# Patient Record
Sex: FEMALE | Race: WHITE | NOT HISPANIC OR LATINO | URBAN - METROPOLITAN AREA
[De-identification: names, ages, dates, MRNs, and addresses within clinical notes are randomized per-mention and may not be internally consistent; named-entity substitution may affect disease eponyms.]

---

## 2017-01-13 ENCOUNTER — AMBULATORY - HEALTHEAST (OUTPATIENT)
Dept: LAB | Facility: HOSPITAL | Age: 42
End: 2017-01-13

## 2017-01-13 DIAGNOSIS — Z79.899 ENCOUNTER FOR LONG-TERM (CURRENT) USE OF OTHER MEDICATIONS: ICD-10-CM

## 2017-02-15 ENCOUNTER — AMBULATORY - HEALTHEAST (OUTPATIENT)
Dept: LAB | Facility: HOSPITAL | Age: 42
End: 2017-02-15

## 2017-02-15 DIAGNOSIS — Z79.899 ENCOUNTER FOR LONG-TERM (CURRENT) USE OF OTHER MEDICATIONS: ICD-10-CM

## 2017-05-08 ENCOUNTER — AMBULATORY - HEALTHEAST (OUTPATIENT)
Dept: LAB | Facility: HOSPITAL | Age: 42
End: 2017-05-08

## 2017-05-08 DIAGNOSIS — F25.9 SCHIZOAFFECTIVE DISORDER (H): ICD-10-CM

## 2017-06-20 ENCOUNTER — AMBULATORY - HEALTHEAST (OUTPATIENT)
Dept: LAB | Facility: HOSPITAL | Age: 42
End: 2017-06-20

## 2017-06-20 DIAGNOSIS — F25.0 SCHIZOAFFECTIVE DISORDER, BIPOLAR TYPE (H): ICD-10-CM

## 2017-07-18 ENCOUNTER — AMBULATORY - HEALTHEAST (OUTPATIENT)
Dept: LAB | Facility: HOSPITAL | Age: 42
End: 2017-07-18

## 2017-07-18 DIAGNOSIS — Z79.899 ENCOUNTER FOR LONG-TERM (CURRENT) USE OF OTHER MEDICATIONS: ICD-10-CM

## 2017-07-18 DIAGNOSIS — F25.0 SCHIZOAFFECTIVE DISORDER, BIPOLAR TYPE (H): ICD-10-CM

## 2017-08-15 ENCOUNTER — AMBULATORY - HEALTHEAST (OUTPATIENT)
Dept: LAB | Facility: HOSPITAL | Age: 42
End: 2017-08-15

## 2017-08-15 DIAGNOSIS — F25.0 SCHIZOAFFECTIVE DISORDER, BIPOLAR TYPE (H): ICD-10-CM

## 2017-08-15 DIAGNOSIS — Z79.899 ENCOUNTER FOR LONG-TERM (CURRENT) USE OF OTHER MEDICATIONS: ICD-10-CM

## 2017-09-18 ENCOUNTER — AMBULATORY - HEALTHEAST (OUTPATIENT)
Dept: LAB | Facility: HOSPITAL | Age: 42
End: 2017-09-18

## 2017-09-18 DIAGNOSIS — F25.0 SCHIZOAFFECTIVE DISORDER, BIPOLAR TYPE (H): ICD-10-CM

## 2017-09-18 DIAGNOSIS — Z79.899 ENCOUNTER FOR LONG-TERM (CURRENT) USE OF OTHER MEDICATIONS: ICD-10-CM

## 2017-11-06 ENCOUNTER — AMBULATORY - HEALTHEAST (OUTPATIENT)
Dept: LAB | Facility: HOSPITAL | Age: 42
End: 2017-11-06

## 2017-11-06 DIAGNOSIS — Z79.899 NEED FOR PROPHYLACTIC CHEMOTHERAPY: ICD-10-CM

## 2017-12-11 ENCOUNTER — AMBULATORY - HEALTHEAST (OUTPATIENT)
Dept: LAB | Facility: HOSPITAL | Age: 42
End: 2017-12-11

## 2017-12-11 DIAGNOSIS — Z79.899 DRUG THERAPY: ICD-10-CM

## 2018-01-08 ENCOUNTER — AMBULATORY - HEALTHEAST (OUTPATIENT)
Dept: LAB | Facility: HOSPITAL | Age: 43
End: 2018-01-08

## 2018-01-08 DIAGNOSIS — F25.0 SCHIZOAFFECTIVE DISORDER, BIPOLAR TYPE (H): ICD-10-CM

## 2018-01-08 DIAGNOSIS — Z79.899 NEED FOR PROPHYLACTIC CHEMOTHERAPY: ICD-10-CM

## 2018-01-08 LAB
BASOPHILS # BLD AUTO: 0.1 THOU/UL (ref 0–0.2)
BASOPHILS NFR BLD AUTO: 1 % (ref 0–2)
EOSINOPHIL # BLD AUTO: 0.2 THOU/UL (ref 0–0.4)
EOSINOPHIL NFR BLD AUTO: 2 % (ref 0–6)
ERYTHROCYTE [DISTWIDTH] IN BLOOD BY AUTOMATED COUNT: 13.3 % (ref 11–14.5)
HCT VFR BLD AUTO: 42.3 % (ref 35–47)
HGB BLD-MCNC: 14.6 G/DL (ref 12–16)
LYMPHOCYTES # BLD AUTO: 1.9 THOU/UL (ref 0.8–4.4)
LYMPHOCYTES NFR BLD AUTO: 18 % (ref 20–40)
MCH RBC QN AUTO: 30.9 PG (ref 27–34)
MCHC RBC AUTO-ENTMCNC: 34.5 G/DL (ref 32–36)
MCV RBC AUTO: 90 FL (ref 80–100)
MONOCYTES # BLD AUTO: 0.4 THOU/UL (ref 0–0.9)
MONOCYTES NFR BLD AUTO: 4 % (ref 2–10)
NEUTROPHILS # BLD AUTO: 7.6 THOU/UL (ref 2–7.7)
NEUTROPHILS NFR BLD AUTO: 75 % (ref 50–70)
PLATELET # BLD AUTO: 208 THOU/UL (ref 140–440)
PMV BLD AUTO: 10.3 FL (ref 8.5–12.5)
RBC # BLD AUTO: 4.72 MILL/UL (ref 3.8–5.4)
WBC: 10.2 THOU/UL (ref 4–11)

## 2018-03-08 ENCOUNTER — AMBULATORY - HEALTHEAST (OUTPATIENT)
Dept: LAB | Facility: HOSPITAL | Age: 43
End: 2018-03-08

## 2018-03-08 DIAGNOSIS — Z79.899 ENCOUNTER FOR LONG-TERM (CURRENT) USE OF MEDICATIONS: ICD-10-CM

## 2018-03-08 DIAGNOSIS — F25.0 SCHIZOAFFECTIVE DISORDER, BIPOLAR TYPE (H): ICD-10-CM

## 2018-03-08 LAB
BASOPHILS # BLD AUTO: 0 THOU/UL (ref 0–0.2)
BASOPHILS NFR BLD AUTO: 1 % (ref 0–2)
EOSINOPHIL # BLD AUTO: 0.2 THOU/UL (ref 0–0.4)
EOSINOPHIL NFR BLD AUTO: 2 % (ref 0–6)
ERYTHROCYTE [DISTWIDTH] IN BLOOD BY AUTOMATED COUNT: 13.7 % (ref 11–14.5)
HCT VFR BLD AUTO: 39.8 % (ref 35–47)
HGB BLD-MCNC: 14 G/DL (ref 12–16)
LYMPHOCYTES # BLD AUTO: 1.7 THOU/UL (ref 0.8–4.4)
LYMPHOCYTES NFR BLD AUTO: 24 % (ref 20–40)
MCH RBC QN AUTO: 31.2 PG (ref 27–34)
MCHC RBC AUTO-ENTMCNC: 35.2 G/DL (ref 32–36)
MCV RBC AUTO: 89 FL (ref 80–100)
MONOCYTES # BLD AUTO: 0.5 THOU/UL (ref 0–0.9)
MONOCYTES NFR BLD AUTO: 7 % (ref 2–10)
NEUTROPHILS # BLD AUTO: 4.6 THOU/UL (ref 2–7.7)
NEUTROPHILS NFR BLD AUTO: 66 % (ref 50–70)
PLATELET # BLD AUTO: 188 THOU/UL (ref 140–440)
PMV BLD AUTO: 10.5 FL (ref 8.5–12.5)
RBC # BLD AUTO: 4.49 MILL/UL (ref 3.8–5.4)
WBC: 7.1 THOU/UL (ref 4–11)

## 2018-04-18 ENCOUNTER — OFFICE VISIT (OUTPATIENT)
Dept: OTHER | Facility: OUTPATIENT CENTER | Age: 43
End: 2018-04-18
Payer: MEDICARE

## 2018-04-18 ENCOUNTER — MEDICAL CORRESPONDENCE (OUTPATIENT)
Dept: HEALTH INFORMATION MANAGEMENT | Facility: CLINIC | Age: 43
End: 2018-04-18

## 2018-04-18 DIAGNOSIS — F25.0 SCHIZOAFFECTIVE DISORDER, BIPOLAR TYPE (H): Primary | ICD-10-CM

## 2018-04-18 DIAGNOSIS — F52.9 FEMALE SEXUAL DYSFUNCTION: ICD-10-CM

## 2018-04-18 ASSESSMENT — ANXIETY QUESTIONNAIRES
1. FEELING NERVOUS, ANXIOUS, OR ON EDGE: MORE THAN HALF THE DAYS
GAD7 TOTAL SCORE: 14
7. FEELING AFRAID AS IF SOMETHING AWFUL MIGHT HAPPEN: MORE THAN HALF THE DAYS
5. BEING SO RESTLESS THAT IT IS HARD TO SIT STILL: MORE THAN HALF THE DAYS
3. WORRYING TOO MUCH ABOUT DIFFERENT THINGS: MORE THAN HALF THE DAYS
6. BECOMING EASILY ANNOYED OR IRRITABLE: MORE THAN HALF THE DAYS
2. NOT BEING ABLE TO STOP OR CONTROL WORRYING: MORE THAN HALF THE DAYS

## 2018-04-18 ASSESSMENT — PATIENT HEALTH QUESTIONNAIRE - PHQ9: 5. POOR APPETITE OR OVEREATING: MORE THAN HALF THE DAYS
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
## 2018-04-18 NOTE — MR AVS SNAPSHOT
After Visit Summary   4/18/2018    Kristie Behrens    MRN: [id number]           Patient Information     Date Of Birth          1975        Visit Information        Provider Department      4/18/2018 5:30 PM Angela Walden LP [facility]        Today's Diagnoses     Schizoaffective disorder, bipolar type (H)    -  1    Female sexual dysfunction           Follow-ups after your visit        Your next 10 appointments already scheduled     May 02, 2018  4:00 PM CDT   Individual Therapy 53+ minutes with Angela Walden LP   [facility] ([facility])    [address]  [address]  [facility] [address]   [phone]            May 16, 2018  4:00 PM CDT   Individual Therapy 53+ minutes with Angela Walden LP   [facility] ([facility])    [address]  [address]  [facility] [address]   [phone]            May 30, 2018  4:00 PM CDT   Individual Therapy 53+ minutes with Angela Walden LP   [facility] ([facility])    [address]  [address]  [facility] [address]   [phone]            Jun 13, 2018  4:00 PM CDT   Individual Therapy 53+ minutes with Angela Walden LP   [facility] ([facility])    [address]  [address]  [facility] [address]   [phone]            Jun 27, 2018  4:00 PM CDT   Individual Therapy 53+ minutes with Angela Walden LP   [facility] ([facility])    [address]  [address]  [facility] [address]   [phone]              Who to contact     Please call your clinic at [phone] to:    Ask questions about your health    Make or cancel appointments    Discuss your medicines    Learn about your test results    Speak to your doctor            Additional Information About Your Visit        MyChart Information     MyChart is  an electronic gateway that provides easy, online access to your medical records. With BackTrack, you can request a clinic appointment, read your test results, renew a prescription or communicate with your care team.     To sign up for BackTrack visit the website at [email or web address]   You will be asked to enter the access code listed below, as well as some personal information. Please follow the directions to create your username and password.     Your access code is: [email or web address]  Expires: 2018 11:54 PM     Your access code will  in 90 days. If you need help or a new code, please contact your [facility] or call [phone] for assistance.        Care EveryWhere ID     This is your Care EveryWhere ID. This could be used by other organizations to access your [facility] medical records  [id number]         Blood Pressure from Last 3 Encounters:   No data found for BP    Weight from Last 3 Encounters:   No data found for Wt              We Performed the Following     Diagnostic Assessment (complete) [07052]     Psychotherapy Interactive Complexity [89146]        Primary Care Provider    None Specified       No primary provider on file.        Equal Access to Services     [facility] PeninsulaFRANKIE : Hadii philly Vicente, bill bravo, chrissy palacios, lynn de león . So [facility] [phone].    ATENCIÓN: Si habla español, tiene a andrews disposición servicios gratuitos de asistencia lingüística. Llame al [phone].    We comply with applicable federal civil rights laws and [place] laws. We do not discriminate on the basis of race, color, national origin, age, disability, sex, sexual orientation, or gender identity.            Thank you!     Thank you for choosing [facility]  for your care. Our goal is always to provide you with excellent care. Hearing back from our patients is one way we can continue to improve our services.  Please take a few minutes to complete the written survey that you may receive in the mail after your visit with us. Thank you!             Your Updated Medication List - Protect others around you: Learn how to safely use, store and throw away your medicines at www.disposemymeds.org.      Notice  As of 4/18/2018 11:59 PM    You have not been prescribed any medications.

## 2018-04-19 ENCOUNTER — TELEPHONE (OUTPATIENT)
Dept: OTHER | Facility: OUTPATIENT CENTER | Age: 43
End: 2018-04-19

## 2018-04-20 ASSESSMENT — ANXIETY QUESTIONNAIRES: GAD7 TOTAL SCORE: 14

## 2018-04-20 ASSESSMENT — PATIENT HEALTH QUESTIONNAIRE - PHQ9: SUM OF ALL RESPONSES TO PHQ QUESTIONS 1-9: 18

## 2018-04-22 PROBLEM — F52.9 FEMALE SEXUAL DYSFUNCTION: Status: ACTIVE | Noted: 2018-04-22

## 2018-04-22 PROBLEM — F25.0 SCHIZOAFFECTIVE DISORDER, BIPOLAR TYPE (H): Status: ACTIVE | Noted: 2018-04-22

## 2018-04-29 ENCOUNTER — TELEPHONE (OUTPATIENT)
Dept: OTHER | Facility: OUTPATIENT CENTER | Age: 43
End: 2018-04-29

## 2018-05-01 ENCOUNTER — AMBULATORY - HEALTHEAST (OUTPATIENT)
Dept: LAB | Facility: HOSPITAL | Age: 43
End: 2018-05-01

## 2018-05-01 DIAGNOSIS — Z79.899 ENCOUNTER FOR LONG-TERM (CURRENT) USE OF MEDICATIONS: ICD-10-CM

## 2018-05-01 LAB
BASOPHILS # BLD AUTO: 0.1 THOU/UL (ref 0–0.2)
BASOPHILS NFR BLD AUTO: 1 % (ref 0–2)
EOSINOPHIL # BLD AUTO: 0.1 THOU/UL (ref 0–0.4)
EOSINOPHIL NFR BLD AUTO: 2 % (ref 0–6)
ERYTHROCYTE [DISTWIDTH] IN BLOOD BY AUTOMATED COUNT: 13.2 % (ref 11–14.5)
HCT VFR BLD AUTO: 41.4 % (ref 35–47)
HGB BLD-MCNC: 14.5 G/DL (ref 12–16)
LYMPHOCYTES # BLD AUTO: 1.5 THOU/UL (ref 0.8–4.4)
LYMPHOCYTES NFR BLD AUTO: 17 % (ref 20–40)
MCH RBC QN AUTO: 31.3 PG (ref 27–34)
MCHC RBC AUTO-ENTMCNC: 35 G/DL (ref 32–36)
MCV RBC AUTO: 89 FL (ref 80–100)
MONOCYTES # BLD AUTO: 0.7 THOU/UL (ref 0–0.9)
MONOCYTES NFR BLD AUTO: 8 % (ref 2–10)
NEUTROPHILS # BLD AUTO: 6.1 THOU/UL (ref 2–7.7)
NEUTROPHILS NFR BLD AUTO: 73 % (ref 50–70)
PLATELET # BLD AUTO: 222 THOU/UL (ref 140–440)
PMV BLD AUTO: 10.4 FL (ref 8.5–12.5)
RBC # BLD AUTO: 4.64 MILL/UL (ref 3.8–5.4)
WBC: 8.5 THOU/UL (ref 4–11)

## 2018-05-02 ENCOUNTER — TELEPHONE (OUTPATIENT)
Dept: OTHER | Facility: OUTPATIENT CENTER | Age: 43
End: 2018-05-02

## 2018-05-02 ENCOUNTER — OFFICE VISIT (OUTPATIENT)
Dept: OTHER | Facility: OUTPATIENT CENTER | Age: 43
End: 2018-05-02
Payer: MEDICARE

## 2018-05-02 DIAGNOSIS — F52.9 FEMALE SEXUAL DYSFUNCTION: Primary | ICD-10-CM

## 2018-05-02 DIAGNOSIS — F25.0 SCHIZOAFFECTIVE DISORDER, BIPOLAR TYPE (H): ICD-10-CM

## 2018-05-02 NOTE — PATIENT INSTRUCTIONS
Assignment:  1. Get copy of treatment plan from .  2. Ask  to come to session.  3. Tell therapist about therapy at [facility]

## 2018-05-02 NOTE — MR AVS SNAPSHOT
After Visit Summary   5/2/2018    Kristie Behrens    MRN: [id number]           Patient Information     Date Of Birth          1975        Visit Information        Provider Department      5/2/2018 4:00 PM Angela Walden LP [facility]        Today's Diagnoses     Female sexual dysfunction    -  1    Schizoaffective disorder, bipolar type (H)          Care Instructions    Assignment:  1. Get copy of treatment plan from .  2. Ask  to come to session.  3. Tell therapist about therapy at [facility]          Follow-ups after your visit        Your next 10 appointments already scheduled     May 16, 2018  4:00 PM CDT   Individual Therapy 53+ minutes with Angela Walden LP   [facility] ([facility])    [address]  [address]  [facility] [address]   [phone]            May 30, 2018  4:00 PM CDT   Individual Therapy 53+ minutes with Angela Walden LP   [facility] ([facility])    [address]  [address]  [facility] [address]   [phone]            Jun 13, 2018  4:00 PM CDT   Individual Therapy 53+ minutes with Angela Walden LP   [facility] ([facility])    [address]  [address]  [facility] [address]   [phone]            Jun 27, 2018  4:00 PM CDT   Individual Therapy 53+ minutes with Angela Walden LP   [facility] ([facility])    [address]  [address]  [facility] [address]   [phone]            Jul 11, 2018  4:00 PM CDT   Individual Therapy 53+ minutes with Angela Walden LP   [facility] ([facility])    [address]  [address]  [facility] [address]   [phone]            Jul 25, 2018  4:00 PM CDT   Individual Therapy 53+ minutes with Angela Walden LP   [facility] ([facility])    [address] [facility] [address]  [address]  [facility] [address]   [phone]              Who to contact     Please call your clinic at [phone] to:    Ask questions about your health    Make or cancel appointments    Discuss your medicines    Learn about your test results    Speak to your doctor            Additional Information About Your Visit        MyChart Information     [employer] is an electronic gateway that provides easy, online access to your medical records. With Parallocity, you can request a clinic appointment, read your test results, renew a prescription or communicate with your care team.     To sign up for Parallocity visit the website at [email or web address]   You will be asked to enter the access code listed below, as well as some personal information. Please follow the directions to create your username and password.     Your access code is: [email or web address]  Expires: 2018 11:54 PM     Your access code will  in 90 days. If you need help or a new code, please contact your [facility] or call [phone] for assistance.        Care EveryWhere ID     This is your Care EveryWhere ID. This could be used by other organizations to access your [facility] medical records  [id number]         Blood Pressure from Last 3 Encounters:   No data found for BP    Weight from Last 3 Encounters:   No data found for Wt              We Performed the Following     Individual Psychotherapy (53+ min) [43475]     Mental Health Tx Plan Scan (HIM Scan)     Psychotherapy Interactive Complexity [74886]        Primary Care Provider    None Specified       No primary provider on file.        Equal Access to Services     NI LEAL : Hadii philly Vicente, bill bravo, lynn goss. So [facility] [phone].    ATENCIÓN: Si habla español, tiene a andrews disposición servicios gratuitos de asistencia lingüística. Llame al  [phone].    We comply with applicable federal civil rights laws and [place] laws. We do not discriminate on the basis of race, color, national origin, age, disability, sex, sexual orientation, or gender identity.            Thank you!     Thank you for choosing [facility]  for your care. Our goal is always to provide you with excellent care. Hearing back from our patients is one way we can continue to improve our services. Please take a few minutes to complete the written survey that you may receive in the mail after your visit with us. Thank you!             Your Updated Medication List - Protect others around you: Learn how to safely use, store and throw away your medicines at www.disposemymeds.org.      Notice  As of 5/2/2018 11:59 PM    You have not been prescribed any medications.

## 2018-05-02 NOTE — PROGRESS NOTES
"[facility]  Program in Human Sexuality  Department of Family Medicine & Community Health  [facility]   [address]               [place] [address]  Phone: [phone]  Fax: [phone]  [email or web address]      [facility] -  Case Progress Note    Date of Service: 5/02/18  Client Name: Kristie Behrens  YOB: 1975  MRN:  [id number]  Treating Provider: Angela \"René\" SAURABH Walden, Ph.D  Type of Session: Individual  Present in Session: Patient only  Number of Minutes:  60  Health Maintenance Summary - Mental Health Treatment Plan       Status Date      Mental Health Tx Plan Q2 MOS Next Due 7/2/2018      Done 5/2/2018         Current Symptoms/Status:  Patient is seriously and persistently mental ill and is living on SSI most of her adulthood. Patient identifies as lesbian but has never had a lesbian sexual relationship. She is interested in finding a relationship. It has been 23 yrs since she's been in a \"love relationship\" and she would like to be in one. Noted she has \"some FGM (, Tibetan)\" issues about sexuality that involved an involuntary vaginal heroin overdose many years ago. Numerous Psychiatric hospitalizations: 2011, [facility]; 2007, [facility]; 2004, [facility]. Current Psychiatrist is Dr. Aramis Grover MD who prescribed Zyprexa, Latuda, 40 mg, Ambien. [facility] to present. , [facility], [facility], for 5 yrs. Current therapist - Es Hodge, every other week since 2017.     Progress Toward Treatment Goals:   This is 1st session since Diagnostic Assessment. Sleeping better in past 2 wks, getting out and doing things (having coffee at coffee shop, buy incense, grocery shopping weekly). Struggles in winter time. Stopped going to AA meetings end of March 2018 b/c it didn't feel good.     Intervention: Modality and Description:  Individual cognitive-behavioral, " "solution-focused, strength-based psychotherapy. Developed Treatment Plan which was signed and dated by patient and therapist. Encouraged patient to continue in AA. Patient expressed some dissatisfaction with current providers who see her as more mentally ill than she believes she is.    Response to Intervention:  Responsive. Struggled to write treatment plan on the board in a coherent, readable fashion but was cooperative in attempting to complete task. Seems to be in denial as to the seriousness of her mental health dx and issues.     Assignment:  1. Get copy of treatment plan from .  2. Ask  to come to session.    Interactive Complexity:  Communication difficulties present during current the psychiatric procedure included the need to manage maladaptive communication related to high anxiety, high reactivity, repeated questions, that complicated delivery of care.    Diagnosis:  Diagnoses       Codes Comments    Female sexual dysfunction    -  Primary F52.9     Schizoaffective disorder, bipolar type (H)     F25.0         Plan / Need for Future Services:  Return for individual therapy to treat dx problems in 2 weeks.        Angela \"René\" SAURABH Walden, Ph.D.,   Clinical/Medical Director, [place] Licensed Psychologist  [place] Licensed Marriage & Family Therapist & State Approved Supervisor    "

## 2018-05-03 LAB
CLOZAPINE SERPL-MCNC: 1032 NG/ML
CLOZAPINE+NOR SERPL-MCNC: 1753 NG/ML
CNO SERPL-MCNC: 122 NG/ML
NORCLOZAPINE SERPL-MCNC: 599 NG/ML

## 2018-05-04 NOTE — TELEPHONE ENCOUNTER
"First saw her in 2011 and last saw her April 10, 2015. Had extensive mental health team at  with schizoaffective disorder, bipolar type. Delusional in nature. Mentally unstable, very inconsistent attendance. Disregulated and lots of psychiatric crises. Still calls [facility] 1-2x per yr talking about the millions of dollars she has received in a settlement offering to send Dr. Byrnes money for inappropriate things. Wanted to work on issues of intimacy, sexuality and trauma but many of what she was talking about was delusional in nature. Has recommended to patient that they are not an appropriate site given the extent of her psychiatric issues and she needs to deal with her mental health issues with her psychiatric team.       Angela \"René\" SAURABH Walden, Ph.D.,   Clinical/Medical Director, [place] Licensed Psychologist  [place] Licensed Marriage & Family Therapist & State Approved Supervisor    "

## 2018-05-16 ENCOUNTER — OFFICE VISIT (OUTPATIENT)
Dept: OTHER | Facility: OUTPATIENT CENTER | Age: 43
End: 2018-05-16
Payer: MEDICARE

## 2018-05-16 DIAGNOSIS — F25.0 SCHIZOAFFECTIVE DISORDER, BIPOLAR TYPE (H): Primary | ICD-10-CM

## 2018-05-16 DIAGNOSIS — F52.9 FEMALE SEXUAL DYSFUNCTION: ICD-10-CM

## 2018-05-16 NOTE — MR AVS SNAPSHOT
After Visit Summary   5/16/2018    Kristie Behrens    MRN: [id number]           Patient Information     Date Of Birth          1975        Visit Information        Provider Department      5/16/2018 4:00 PM Angela Walden LP [facility]        Today's Diagnoses     Schizoaffective disorder, bipolar type (H)    -  1    Female sexual dysfunction           Follow-ups after your visit        Your next 10 appointments already scheduled     May 30, 2018  4:00 PM CDT   Individual Therapy 53+ minutes with Angela Walden LP   [facility] ([facility])    [address]  [address] 75[address]  [facility] [address]   [phone]            Jun 13, 2018  4:00 PM CDT   Individual Therapy 53+ minutes with Angela Walden LP   [facility] ([facility])    [address]  [address]  [facility] [address]   [phone]            Jun 27, 2018  4:00 PM CDT   Individual Therapy 53+ minutes with Angela Walden LP   [facility] ([facility])    [address]  [address]  [facility] [address]   [phone]            Jul 11, 2018  4:00 PM CDT   Individual Therapy 53+ minutes with Angela Walden LP   [facility] ([facility])    [address]  [address]  [facility] [address]   [phone]            Jul 25, 2018  4:00 PM CDT   Individual Therapy 53+ minutes with Angela Walden LP   [facility] ([facility])    [address]  [address]  [facility] [address]   [phone]              Who to contact     Please call your clinic at [phone] to:    Ask questions about your health    Make or cancel appointments    Discuss your medicines    Learn about your test results    Speak to your doctor            Additional Information About Your Visit        MyChart Information     MyChart is  an electronic gateway that provides easy, online access to your medical records. With TidyClub, you can request a clinic appointment, read your test results, renew a prescription or communicate with your care team.     To sign up for TidyClub visit the website at [email or web address]   You will be asked to enter the access code listed below, as well as some personal information. Please follow the directions to create your username and password.     Your access code is: [email or web address]  Expires: 2018 11:54 PM     Your access code will  in 90 days. If you need help or a new code, please contact your [facility] or call [phone] for assistance.        Care EveryWhere ID     This is your Care EveryWhere ID. This could be used by other organizations to access your [place] medical records  [id number]         Blood Pressure from Last 3 Encounters:   No data found for BP    Weight from Last 3 Encounters:   No data found for Wt              We Performed the Following     Individual Psychotherapy (53+ min) [15563]     Psychotherapy Interactive Complexity [75615]        Primary Care Provider    None Specified       No primary provider on file.        Equal Access to Services     MERCEDEZ [facility] : Hadii philly Vicente, bill bravo, chrissy palacios, lynn de león . So [facility] [phone].    ATENCIÓN: Si habla español, tiene a andrews disposición servicios gratuitos de asistencia lingüística. Llame al [phone].    We comply with applicable federal civil rights laws and [place] laws. We do not discriminate on the basis of race, color, national origin, age, disability, sex, sexual orientation, or gender identity.            Thank you!     Thank you for choosing [facility]  for your care. Our goal is always to provide you with excellent care. Hearing back from our patients is one way we can continue to improve our services.  Please take a few minutes to complete the written survey that you may receive in the mail after your visit with us. Thank you!             Your Updated Medication List - Protect others around you: Learn how to safely use, store and throw away your medicines at www.disposemymeds.org.      Notice  As of 5/16/2018  8:48 PM    You have not been prescribed any medications.

## 2018-05-16 NOTE — PROGRESS NOTES
"[facility]  Program in Human Sexuality  Department of Family Medicine & Community Health  [facility]   [address]               [place] [address]  Phone: [phone]  Fax: [phone]  [email or web address]      [facility] -  Case Progress Note    Date of Service: 5/16/18  Client Name: Kristie Behrens  YOB: 1975  MRN:  [id number]  Treating Provider: Angela \"René\" SAURABH Walden, Ph.D  Type of Session: Individual  Present in Session: Patient only  Number of Minutes:  70  Health Maintenance Summary - Mental Health Treatment Plan       Status Date      Mental Health Tx Plan Q2 MOS Next Due 7/2/2018      Done 5/2/2018         Current Symptoms/Status:  Patient is seriously and persistently mental ill and has lived on SSI most of her adulthood. Patient identifies as lesbian but has never had a lesbian sexual relationship. She is interested in finding a relationship. It has been 23 yrs since she's been in a \"love relationship\" and she would like to be in one. Noted she has \"some FGM (, Tibetan)\" issues about sexuality that involved an involuntary vaginal heroin overdose many years ago. Numerous Psychiatric hospitalizations: 2011, [facility]; 2007, [facility]; 2004, [facility]. Current Psychiatrist is Dr. Aramis Grover MD who prescribed Zyprexa, Latuda, 40 mg, Ambien. [facility] by [facility] to present. , [facility], Thiago Juarez, for 5 yrs. Current therapist - Es Hodeg, every other week since 2017.     Progress Toward Treatment Goals:   Has been a good week with lots of activities. Continues meeting with  (Mackenzie) every Tuesday, Fridays, and Sundays; added extra day b/c she sat in house. Activities: library 2x (3 books), 2x to [employer] (swimming, whirlpool/sauna, weight machines), supper 2x, bought roses), grocery store 2-4x. Plans to go AA mtg. AA meeting at [facility]. " Sleeping better in past 2 wks, getting out and doing things (having coffee at coffee shop, buy incense, grocery shopping weekly). Attended meditation and sermon at [facility].    Intervention: Modality and Description:  Individual cognitive-behavioral, solution-focused, strength-based psychotherapy. Encouraged patient to continue in AA. Patient called , Mackenzie and invited her to next session; Mackenzie indicated she would attend. Session focussed on completing Self-Identified Lesbian Internalized Homophobia Scale (Anuja and Roni, 2009) and Modern Homonegativity Scale (Sriram & Sriram, 2002); patient demonstrated low homophobia and low internalized homophobia on those scales. Patient expressed some dissatisfaction with current providers who see her as more mentally ill than she believes she is and she worries it may be related to old attitudes about mental illness and homosexuality. At meeting with , Mackenzie, we will discuss how they can show more acceptance/affirmation of her identity as a lesbian and ask if there are any lingering feelings left over from the days when being han was a mental illness. Discussed how her serious mental illness can impede ability to form intimate relationships.     Response to Intervention:  Responsive, agreeable and cooperative but seems to be in denial as to the seriousness of her mental health dx and issues. Seems to be stable and functional for past several weeks. Affect is flat and cognitive functioning is concrete and simplistic.    Assignment:  1. Come prepared to discuss concerns about mental illness and homosexuality with  in next session. Get copy of treatment plan from .    Interactive Complexity:  Communication difficulties present during current the psychiatric procedure included the need to manage maladaptive communication related to serious mental illness and cognitive flattening that complicated delivery of  "care.    Diagnosis:  Diagnoses       Codes Comments    Female sexual dysfunction    -  Primary F52.9     Schizoaffective disorder, bipolar type (H)     F25.0         Plan / Need for Future Services:  Return for individual therapy to treat dx problems in 2 weeks.        Angela \"René\" SAURABH Walden, Ph.D.,   Clinical/Medical Director, [place] Licensed Psychologist  [place] Licensed Marriage & Family Therapist & State Approved SupervisorFirst saw her in 2011 and last saw her April 10, 2015. Had extensive mental health team at               "

## 2018-05-26 ENCOUNTER — TELEPHONE (OUTPATIENT)
Dept: OTHER | Facility: OUTPATIENT CENTER | Age: 43
End: 2018-05-26
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
## 2018-05-30 ENCOUNTER — OFFICE VISIT (OUTPATIENT)
Dept: OTHER | Facility: OUTPATIENT CENTER | Age: 43
End: 2018-05-30
Payer: MEDICARE

## 2018-05-30 ENCOUNTER — TRANSFERRED RECORDS (OUTPATIENT)
Dept: HEALTH INFORMATION MANAGEMENT | Facility: CLINIC | Age: 43
End: 2018-05-30

## 2018-05-30 DIAGNOSIS — F25.0 SCHIZOAFFECTIVE DISORDER, BIPOLAR TYPE (H): Primary | ICD-10-CM

## 2018-05-30 DIAGNOSIS — F52.9 FEMALE SEXUAL DYSFUNCTION: ICD-10-CM

## 2018-05-30 NOTE — PROGRESS NOTES
"[facility]  Program in Human Sexuality  Department of Family Medicine & Community Health  [facility]   [address]               [place] [address]  Phone: [phone]  Fax: [phone]  [email or web address]      [facility] -  Case Progress Note    Date of Service: 5/30/18  Client Name: Kristie Behrens  YOB: 1975  MRN:  [id number]  Treating Provider: Angela \"René\" SAURABH Walden, Ph.D  Type of Session: Individual  Present in Session: Patient, Mackenzie Taylor RN, , [facility] attended for 30 min  Number of Minutes:  60  Health Maintenance Summary - Mental Health Treatment Plan       Status Date      Mental Health Tx Plan Q2 MOS Next Due 7/2/2018      Done 5/2/2018         Current Symptoms/Status:  Serious and persistent mental illness, has lived on SSI most of her adulthood. Patient identifies as lesbian but has never had a lesbian sexual relationship. She is interested in finding a relationship. It has been 23 yrs since she's been in a \"love relationship\" and she would like to be in one. Noted she has \"some FGM (, Tibetan)\" issues about sexuality that involved an involuntary vaginal heroin overdose many years ago. Numerous Psychiatric hospitalizations: 2011, [facility]; 2007, [facility]; 2004, [facility]. Current Psychiatrist is Dr. Aramis Grover MD who prescribed Zyprexa, Latuda, 40 mg, Ambien. [facility] by [facility] to present. , [facility], [facility] Juarez, for 5 yrs. Current therapist - Es Hodge, every other week since 2017.     Progress Toward Treatment Goals:   Brought  (Mackenzie Taylor) to session today as requested. Continues meeting with  (Mackenzie Taylor) and other case workers every Tuesday, Fridays, and Sundays; added extra day b/c she sat in house. Activities: library, 2x to [employer], [employer], grocery store. Plans to go " "AA mtgs and meditation and sermon at [facility].    Intervention: Modality and Description:  Individual cognitive-behavioral, solution-focused, strength-based psychotherapy. Reviewed [facility] Goals and [facility] Treatment Plan; exchanged copies with  (See scanned documents). Discussed how to tailor goals with an LGBTQ+ focus: LGBTQ+AA groups, LGBTQ+ coffee houses, LGBTQ+ AA sponsor. Reviewed major goals at [facility]:  1. Coffee with AA member  2. Call for own refills  3. Meet at a coffee shop. [employer] shops.  4. Use distractions from negative voices (write, color, meditation, etc.). Psychiatrist prescribed PRN Zyprexa for voices when they get bad. Voices and isolation are vicious cycle.  5. Blood drawn 1x a mo which pt is responsible for. Takes bus to [facility] in [place].   6. Fight her negative perceptions of others opinions of her; \"everyone hates me, no one loves me, no one cares about me, self-hatred\".     Mackenzie Taylor has been with [facility] team for 3-4 mos but patient worked with [facility] for 10 yrs. Patient has new goal: looking for a new place to live by Jan 2019.   Reviewed CD hx and agreed to attend LGBTQ+ AA group she used to attend and will investigate finding a mixed LGBTQ+ AA group closer to home to attend (see assignments).  Abstinent from drugs for 14 yrs.  Abstinent from alcohol since Feb 14, 2017  Relapsed Feb [id number]  Abstinent Feb 2012 - 2016    Response to Intervention:  Responsive, agreeable and cooperative; less in denial as to the seriousness of her mental health dx and issues. Seems to be stable and functional for past several weeks. Affect is flat and cognitive functioning is concrete, flat and simplistic.    Assignment:  1. Attend [facility] AA group at [facility] in [facility] ([place]) for LGBT+ at least once before next session.  2. Investigate LGBT+ AA groups and try to find one close to home.   3. Think about attending [facility] AA " "meeting and asking former sponsor Milvia out to coffee and asking her to be a temporary sponsor.    Interactive Complexity:  Communication difficulties present during current the psychiatric procedure included the need to manage maladaptive communication related to serious mental illness and cognitive flattening that complicated delivery of care.    Diagnosis:  Diagnoses       Codes Comments    Female sexual dysfunction    -  Primary F52.9     Schizoaffective disorder, bipolar type (H)     F25.0         Plan / Need for Future Services:  Return for individual therapy to treat dx problems in 2 weeks.        Angela \"René\" SAURABH Walden, Ph.D.,   Clinical/Medical Director, [place] Psychologist  [place] Licensed Marriage & Family Therapist & State Approved SupervisorFirst saw her in 2011 and last saw her April 10, 2015. Had extensive mental health team at               "

## 2018-05-30 NOTE — MR AVS SNAPSHOT
After Visit Summary   5/30/2018    Kristie Behrens    MRN: [id number]           Patient Information     Date Of Birth          1975        Visit Information        Provider Department      5/30/2018 4:00 PM Angela Walden LP [facility]        Today's Diagnoses     Schizoaffective disorder, bipolar type (H)    -  1    Female sexual dysfunction          Care Instructions    Assignment:  1. Attend [facility] AA group at [facility] in [facility] for LGBT+ at least once before next session.  2. Investigate LGBT+ AA groups and try to find one close to home.   3. Think about attending [facility] AA meeting and asking former sponsor Milvia out to coffee and asking her to be a temporary sponsor.          Follow-ups after your visit        Your next 10 appointments already scheduled     Jun 13, 2018  4:00 PM CDT   Individual Therapy 53+ minutes with Angela Walden LP   [facility] ([facility])    [address]  Mail [address] 7521  [facility] [address]   [phone]            Jun 27, 2018  4:00 PM CDT   Individual Therapy 53+ minutes with Angela Walden LP   [facility] ([facility])    [address]  Mail [address] 7521  [facility] [address]   [phone]            Jul 11, 2018  4:00 PM CDT   Individual Therapy 53+ minutes with Angela Walden LP   [facility] ([facility])    [address]  [address] 75[address]  [facility] [address]   [phone]            Jul 25, 2018  4:00 PM CDT   Individual Therapy 53+ minutes with Angela Walden LP   [facility] ([facility])    [address]  [address]  [facility] [address]   [phone]              Who to contact     Please call your clinic at [phone] to:    Ask questions about your health    Make or cancel appointments    Discuss your  medicines    Learn about your test results    Speak to your doctor            Additional Information About Your Visit        MyChart Information     iKaazhart is an electronic gateway that provides easy, online access to your medical records. With iKaazhart, you can request a clinic appointment, read your test results, renew a prescription or communicate with your care team.     To sign up for Grovot visit the website at [email or web address]   You will be asked to enter the access code listed below, as well as some personal information. Please follow the directions to create your username and password.     Your access code is: [email or web address]  Expires: 2018 11:54 PM     Your access code will  in 90 days. If you need help or a new code, please contact your [facility] or call [phone] for assistance.        Care EveryWhere ID     This is your Care EveryWhere ID. This could be used by other organizations to access your [place] medical records  [id number]         Blood Pressure from Last 3 Encounters:   No data found for BP    Weight from Last 3 Encounters:   No data found for Wt              We Performed the Following     Individual Psychotherapy (53+ min) [85962]     Psychotherapy Interactive Complexity [28495]        Primary Care Provider    None Specified       No primary provider on file.        Equal Access to Services     MERCEDEZ LEAL : Milan Vicente, bill bravo, qajordanata kaalmada suzanne, lynn lopez. So [facility] [phone].    ATENCIÓN: Si habla español, tiene a andrews disposición servicios gratuitos de asistencia lingüística. Llame al [phone].    We comply with applicable federal civil rights laws and [place] laws. We do not discriminate on the basis of race, color, national origin, age, disability, sex, sexual orientation, or gender identity.            Thank you!     Thank you for choosing [facility]  for your care. Our goal is always to provide you with excellent care. Hearing back from our patients is one way we can continue to improve our services. Please take a few minutes to complete the written survey that you may receive in the mail after your visit with us. Thank you!             Your Updated Medication List - Protect others around you: Learn how to safely use, store and throw away your medicines at www.disposemymeds.org.      Notice  As of 5/30/2018  5:35 PM    You have not been prescribed any medications.

## 2018-05-30 NOTE — PATIENT INSTRUCTIONS
Assignment:  1. Attend [facility] Women's AA group at [facility] in [facility] ([place] for LGBT+ at least once before next session.  2. Investigate LGBT+ AA groups and try to find one close to home.   3. Think about attending [facility] AA meeting and asking former sponsor Milvia out to coffee and asking her to be a temporary sponsor.

## 2018-05-31 ENCOUNTER — AMBULATORY - HEALTHEAST (OUTPATIENT)
Dept: LAB | Facility: HOSPITAL | Age: 43
End: 2018-05-31

## 2018-05-31 DIAGNOSIS — F25.0 SCHIZOAFFECTIVE DISORDER, BIPOLAR TYPE (H): ICD-10-CM

## 2018-05-31 DIAGNOSIS — Z79.899 ENCOUNTER FOR LONG-TERM (CURRENT) USE OF HIGH-RISK MEDICATION: ICD-10-CM

## 2018-05-31 LAB
ERYTHROCYTE [DISTWIDTH] IN BLOOD BY AUTOMATED COUNT: 12.7 % (ref 11–14.5)
HCT VFR BLD AUTO: 42.9 % (ref 35–47)
HGB BLD-MCNC: 14.9 G/DL (ref 12–16)
MCH RBC QN AUTO: 30.9 PG (ref 27–34)
MCHC RBC AUTO-ENTMCNC: 34.7 G/DL (ref 32–36)
MCV RBC AUTO: 89 FL (ref 80–100)
PLATELET # BLD AUTO: 223 THOU/UL (ref 140–440)
PMV BLD AUTO: 10.4 FL (ref 8.5–12.5)
RBC # BLD AUTO: 4.82 MILL/UL (ref 3.8–5.4)
WBC: 8.5 THOU/UL (ref 4–11)

## 2018-06-05 ENCOUNTER — TELEPHONE (OUTPATIENT)
Dept: OTHER | Facility: OUTPATIENT CENTER | Age: 43
End: 2018-06-05

## 2018-06-13 ENCOUNTER — OFFICE VISIT (OUTPATIENT)
Dept: OTHER | Facility: OUTPATIENT CENTER | Age: 43
End: 2018-06-13
Payer: MEDICAID

## 2018-06-13 DIAGNOSIS — F25.0 SCHIZOAFFECTIVE DISORDER, BIPOLAR TYPE (H): Primary | ICD-10-CM

## 2018-06-13 DIAGNOSIS — F52.9 FEMALE SEXUAL DYSFUNCTION: ICD-10-CM

## 2018-06-13 NOTE — MR AVS SNAPSHOT
After Visit Summary   6/13/2018    Kristie Behrens    MRN: [id number]           Patient Information     Date Of Birth          1975        Visit Information        Provider Department      6/13/2018 4:00 PM Angela Walden LP [facility]        Today's Diagnoses     Schizoaffective disorder, bipolar type (H)    -  1    Female sexual dysfunction          Care Instructions    Assignment:  1. Investigate LGBT+ AA coffee shops: [employer] and Day-by-Day Cafe.   2. Attend [facility] AA meeting in [place] WEEKLY on Wednesdays, 6:30 - 7:30 pm and take time.           Follow-ups after your visit        Your next 10 appointments already scheduled     Jun 26, 2018  3:00 PM CDT   Individual Therapy 53+ minutes with Angela Walden LP   [facility] ([facility])    [address]  Mail [address] 7521  [facility] [address]   [phone]            Jul 10, 2018 11:00 AM CDT   Individual Therapy 53+ minutes with Angela Walden LP   [facility] ([facility])    [address]  [address]  [facility] [address]   [phone]            Jul 24, 2018 11:00 AM CDT   Individual Therapy 53+ minutes with Angela Walden LP   [facility] ([facility])    [address]  Mail [address] 7521  [facility] [address]   [phone]            Aug 07, 2018  3:00 PM CDT   Individual Therapy 53+ minutes with Angela Walden LP   [facility] ([facility])    [address]  Mail [address] 7521  [facility] [address]   [phone]            Aug 21, 2018  5:00 PM CDT   Individual Therapy 53+ minutes with Angela Walden LP   [facility] ([facility])    [address]  Mail [address]  [facility] [address]   [phone]              Who to contact     Please call your clinic at [phone] to:    Ask questions  about your health    Make or cancel appointments    Discuss your medicines    Learn about your test results    Speak to your doctor            Additional Information About Your Visit        MyChart Information     [employer] is an electronic gateway that provides easy, online access to your medical records. With [employer], you can request a clinic appointment, read your test results, renew a prescription or communicate with your care team.     To sign up for [employer] visit the website at [email or web address]   You will be asked to enter the access code listed below, as well as some personal information. Please follow the directions to create your username and password.     Your access code is: [email or web address]  Expires: 2018 11:54 PM     Your access code will  in 90 days. If you need help or a new code, please contact your [facility] or call [phone] for assistance.        Care EveryWhere ID     This is your Care EveryWhere ID. This could be used by other organizations to access your [place] medical records  [id number]         Blood Pressure from Last 3 Encounters:   No data found for BP    Weight from Last 3 Encounters:   No data found for Wt              We Performed the Following     Individual Psychotherapy (53+ min) [08491]     Psychotherapy Interactive Complexity [21028]        Primary Care Provider    None Specified       No primary provider on file.        Equal Access to Services     MERCEDEZ LEAL : Hadii philly smitho Jules, waaxda luqadaha, qaybta kaalmada adeegyada, lynn de león . So [facility] [phone].    ATENCIÓN: Si habla español, tiene a andrews disposición servicios gratuitos de asistencia lingüística. Llame al [phone].    We comply with applicable federal civil rights laws and [place] laws. We do not discriminate on the basis of race, color, national origin, age, disability, sex, sexual orientation, or gender  identity.            Thank you!     Thank you for choosing [facility]  for your care. Our goal is always to provide you with excellent care. Hearing back from our patients is one way we can continue to improve our services. Please take a few minutes to complete the written survey that you may receive in the mail after your visit with us. Thank you!             Your Updated Medication List - Protect others around you: Learn how to safely use, store and throw away your medicines at www.disposemymeds.org.      Notice  As of 6/13/2018 11:59 PM    You have not been prescribed any medications.

## 2018-06-13 NOTE — PATIENT INSTRUCTIONS
Assignment:  1. Investigate LGBT+ AA coffee shops: [employer] and [employer].   2. Attend [facility] AA meeting in [place] WEEKLY on Wednesdays, 6:30 - 7:30 pm and take time.

## 2018-06-13 NOTE — PROGRESS NOTES
"[facility]  Program in Human Sexuality  Department of Family Medicine & Community Health  [facility]   [address]               [place] [address]  Phone: [phone]  Fax: [phone]  [email or web address]      [facility] -  Case Progress Note    Date of Service: 6/13/18  Client Name: Kristie Behrens  YOB: 1975  MRN:  [id number]  Treating Provider: Angela \"René\" SAURABH Walden, Ph.D  Type of Session: Individual  Present in Session: Patient alone  Number of Minutes:  60  Health Maintenance Summary - Mental Health Treatment Plan       Status Date      Mental Health Tx Plan Q2 MOS Next Due 7/2/2018      Done 5/2/2018         Current Symptoms/Status:  Serious and persistent mental illness, has lived on SSI most of her adulthood. Patient identifies as lesbian but has never had a lesbian sexual relationship. She is interested in finding a relationship. It has been 23 yrs since she's been in a \"love relationship\" and she would like to be in one. Noted she has \"some FGM (, Tibetan)\" issues about sexuality that involved an involuntary vaginal heroin overdose many years ago. Numerous Psychiatric hospitalizations: 2011, [facility]; 2007, [facility]; 2004, [facility]. Current Psychiatrist is Dr. Aramis Grover MD who prescribed Zyprexa, Latuda, 40 mg, Ambien. [facility] to present. , DIANE, [facility]iago Juarez, for 5 yrs. Current therapist - Es Hodge, every other week since 2017. Abstinent from drugs for 14 yrs. Abstinent from alcohol since Feb 14, 2017 after relapse Feb 2016 (abstinent Feb 2012 - 2016)    Progress Toward Treatment Goals:   Continues meeting with  (Mackenzie Taylor) and other case workers every Tuesday, Fridays, and Sundays; added extra day. Activities: 1x library, 1x to [employer], supper (daily), grocery store (1x). Plans to go AA mtgs (0x) and meditation, " "yoga, chavo chi  0x [facility] [place]. Coffee at [employer] 3x a week. Reviewed major goals at ACT: 1. Coffee with AA member (no), 2. Call for own refills - 1x (Nicotine gum & Ambien). Quit smoking Dec 2018. 3. Meet at a coffee shop - yes 4. Use distractions from negative voices (write, color, meditation, etc.) - yes, described making paper and will bring in. Psychiatrist prescribed PRN Zyprexa for voices when they get bad. Voices and isolation are vicious cycle. 5. Blood drawn 1x a mo which pt is responsible for. Takes bus to [facility] in [place] - yes; 6. Fight her negative perceptions of others opinions of her; \"everyone hates me, no one loves me, no one cares about me, self-hatred\" - yes.      Intervention: Modality and Description:  Individual cognitive-behavioral, solution-focused, strength-based psychotherapy. Did not attend LGBTQ+ AA group she used to attend nor found a mixed LGBTQ+ AA group closer to home to attend. Discussed importance of attending GLBTQ+ spaces randi the same one so she can develop deeper relationships with people. Identified 2 cafes and [facility] AA meeting in [place]; discussed appropriate transportation and bus routes. Changed therapy sessions so she can attend AA group regularly. Printed out patient instructions.     Response to Intervention:  Responsive, agreeable and cooperative. Maintains stability and functionality of  past several weeks. Affect is flat and cognitive functioning is concrete, flat and simplistic.    Assignment:  1. Attend [facility] group at [facility] in [facility] [facility] [place] for LGBT+ at least once before next session. ON HOLD  2. Investigate LGBT+ AA coffee shops: [employer] and [employer].   3. Attend [facility] AA meeting in [place] WEEKLY on Wednesdays, 6:30 - 7:30 pm and take time.     Interactive Complexity:  Communication difficulties present during current the psychiatric procedure included the need to " "manage maladaptive communication related to serious mental illness and cognitive flattening that complicated delivery of care.    Diagnosis:  Diagnoses       Codes Comments    Female sexual dysfunction    -  Primary F52.9     Schizoaffective disorder, bipolar type (H)     F25.0         Plan / Need for Future Services:  Return for individual therapy to treat dx problems in 2 weeks.        Angela \"René\" SAURABH Wadlen, Ph.D.,   Clinical/Medical Director, [place] Licensed Psychologist  [place] Licensed Marriage & Family Therapist & State Approved SupervisorFirst saw her in 2011 and last saw her April 10, 2015. Had extensive mental health team at             "

## 2018-06-18 ENCOUNTER — AMBULATORY - HEALTHEAST (OUTPATIENT)
Dept: LAB | Facility: CLINIC | Age: 43
End: 2018-06-18

## 2018-06-18 DIAGNOSIS — F25.0 SCHIZOAFFECTIVE DISORDER, BIPOLAR TYPE (H): ICD-10-CM

## 2018-06-18 DIAGNOSIS — Z79.899 NEED FOR PROPHYLACTIC CHEMOTHERAPY: ICD-10-CM

## 2018-06-18 LAB
BASOPHILS # BLD AUTO: 0 THOU/UL (ref 0–0.2)
BASOPHILS NFR BLD AUTO: 0 % (ref 0–2)
EOSINOPHIL # BLD AUTO: 0.1 THOU/UL (ref 0–0.4)
EOSINOPHIL NFR BLD AUTO: 1 % (ref 0–6)
ERYTHROCYTE [DISTWIDTH] IN BLOOD BY AUTOMATED COUNT: 13 % (ref 11–14.5)
HCT VFR BLD AUTO: 43 % (ref 35–47)
HGB BLD-MCNC: 15.2 G/DL (ref 12–16)
LYMPHOCYTES # BLD AUTO: 1.4 THOU/UL (ref 0.8–4.4)
LYMPHOCYTES NFR BLD AUTO: 16 % (ref 20–40)
MCH RBC QN AUTO: 31.2 PG (ref 27–34)
MCHC RBC AUTO-ENTMCNC: 35.3 G/DL (ref 32–36)
MCV RBC AUTO: 88 FL (ref 80–100)
MONOCYTES # BLD AUTO: 0.6 THOU/UL (ref 0–0.9)
MONOCYTES NFR BLD AUTO: 7 % (ref 2–10)
NEUTROPHILS # BLD AUTO: 7 THOU/UL (ref 2–7.7)
NEUTROPHILS NFR BLD AUTO: 76 % (ref 50–70)
PLATELET # BLD AUTO: 203 THOU/UL (ref 140–440)
PMV BLD AUTO: 10.5 FL (ref 8.5–12.5)
RBC # BLD AUTO: 4.87 MILL/UL (ref 3.8–5.4)
WBC: 9.2 THOU/UL (ref 4–11)

## 2018-06-23 LAB
CLOZAPINE SERPL-MCNC: 993 NG/ML
CLOZAPINE+NOR SERPL-MCNC: 1674 NG/ML
CNO SERPL-MCNC: 152 NG/ML
NORCLOZAPINE SERPL-MCNC: 529 NG/ML

## 2018-06-25 ENCOUNTER — TELEPHONE (OUTPATIENT)
Dept: OTHER | Facility: OUTPATIENT CENTER | Age: 43
End: 2018-06-25

## 2018-06-25 NOTE — TELEPHONE ENCOUNTER
"Very confusing message. Not clear if patient cancelled or not. Said she was cancelling b/c she was having urges to drink.  I called back and left message for her to consider attending randi if she was having urges to drink and to contact her ACT team for support and help thru these urges. Schedule shows she cancelled but rescheduled so she   Should be keeping her appt, hopefully.    Angela \"René\" SAURABH Walden, Ph.D.,   Clinical/Medical Director, [place] Licensed Psychologist  [place] Licensed Marriage & Family Therapist & State Approved Supervisor        "

## 2018-06-26 NOTE — TELEPHONE ENCOUNTER
"Patient no showed for today's session; apparently she was trying to cancel yesterday. Will be charged.    Angela \"René\" SAURABH Walden, Ph.D.,   Clinical/Medical Director, [place] Licensed Psychologist  [place] Licensed Marriage & Family Therapist & State Approved Supervisor    "

## 2018-07-05 ENCOUNTER — HOSPITAL ENCOUNTER (OUTPATIENT)
Dept: LAB | Age: 43
Setting detail: SPECIMEN
Discharge: HOME | End: 2018-07-05

## 2018-07-05 ENCOUNTER — AMBULATORY - HEALTHEAST (OUTPATIENT)
Dept: LAB | Facility: CLINIC | Age: 43
End: 2018-07-05

## 2018-07-05 DIAGNOSIS — Z79.899 NEED FOR PROPHYLACTIC CHEMOTHERAPY: ICD-10-CM

## 2018-07-05 DIAGNOSIS — F25.0 SCHIZOAFFECTIVE DISORDER, BIPOLAR TYPE (H): ICD-10-CM

## 2018-07-05 LAB
ERYTHROCYTE [DISTWIDTH] IN BLOOD BY AUTOMATED COUNT: 12.5 % (ref 11–14.5)
HCT VFR BLD AUTO: 42.1 % (ref 35–47)
HGB BLD-MCNC: 14.6 G/DL (ref 12–16)
MCH RBC QN AUTO: 30.8 PG (ref 27–34)
MCHC RBC AUTO-ENTMCNC: 34.7 G/DL (ref 32–36)
MCV RBC AUTO: 89 FL (ref 80–100)
PLATELET # BLD AUTO: 213 THOU/UL (ref 140–440)
PMV BLD AUTO: 11.2 FL (ref 8.5–12.5)
RBC # BLD AUTO: 4.74 MILL/UL (ref 3.8–5.4)
WBC: 8.4 THOU/UL (ref 4–11)

## 2018-07-06 LAB
BASOPHILS # BLD AUTO: 0.1 THOU/UL (ref 0–0.2)
BASOPHILS NFR BLD AUTO: 1 % (ref 0–2)
EOSINOPHIL # BLD AUTO: 0.1 THOU/UL (ref 0–0.4)
EOSINOPHIL NFR BLD AUTO: 1 % (ref 0–6)
LYMPHOCYTES # BLD AUTO: 1.4 THOU/UL (ref 0.8–4.4)
LYMPHOCYTES NFR BLD AUTO: 17 % (ref 20–40)
MONOCYTES # BLD AUTO: 0.6 THOU/UL (ref 0–0.9)
MONOCYTES NFR BLD AUTO: 7 % (ref 2–10)
NEUTROPHILS # BLD AUTO: 6.2 THOU/UL (ref 2–7.7)
NEUTROPHILS NFR BLD AUTO: 75 % (ref 50–70)

## 2018-07-09 ENCOUNTER — TELEPHONE (OUTPATIENT)
Dept: OTHER | Facility: OUTPATIENT CENTER | Age: 43
End: 2018-07-09

## 2018-07-10 ENCOUNTER — OFFICE VISIT (OUTPATIENT)
Dept: OTHER | Facility: OUTPATIENT CENTER | Age: 43
End: 2018-07-10
Payer: MEDICARE

## 2018-07-10 DIAGNOSIS — F52.9 FEMALE SEXUAL DYSFUNCTION: ICD-10-CM

## 2018-07-10 DIAGNOSIS — F25.0 SCHIZOAFFECTIVE DISORDER, BIPOLAR TYPE (H): Primary | ICD-10-CM

## 2018-07-10 NOTE — MR AVS SNAPSHOT
After Visit Summary   7/10/2018    Kristie Behrens    MRN: [id number]           Patient Information     Date Of Birth          1975        Visit Information        Provider Department      7/10/2018 11:00 AM Angela Walden LP [facility]        Today's Diagnoses     Schizoaffective disorder, bipolar type (H)    -  1    Female sexual dysfunction           Follow-ups after your visit        Your next 10 appointments already scheduled     Jul 24, 2018 11:00 AM CDT   Individual Therapy 53+ minutes with Angela Walden LP   [facility] ([facility])    [address]  Mail Code 7521  [facility] [address]   [phone]            Aug 07, 2018  3:00 PM CDT   Individual Therapy 53+ minutes with Angela Walden LP   [facility] ([facility])    [address]  Mail Code 7521  [facility] [address]   [phone]            Aug 21, 2018  5:00 PM CDT   Individual Therapy 53+ minutes with Angela Walden LP   [facility] ([facility])    [address]  Mail [address] 7521  [facility] [address]   [phone]              Who to contact     Please call your clinic at [phone] to:    Ask questions about your health    Make or cancel appointments    Discuss your medicines    Learn about your test results    Speak to your doctor            Additional Information About Your Visit        MyChart Information     [employer] is an electronic gateway that provides easy, online access to your medical records. With [employer], you can request a clinic appointment, read your test results, renew a prescription or communicate with your care team.     To sign up for [facility] visit the website at [email or web address]   You will be asked to enter the access code listed below, as well as some personal information. Please follow the directions to create your username and password.     Your  access code is: [email or web address]  Expires: 2018 11:54 PM     Your access code will  in 90 days. If you need help or a new code, please contact your [facility] or call [phone] for assistance.        Care EveryWhere ID     This is your Care EveryWhere ID. This could be used by other organizations to access your [place] medical records  [id number]         Blood Pressure from Last 3 Encounters:   No data found for BP    Weight from Last 3 Encounters:   No data found for Wt              We Performed the Following     Individual Psychotherapy (53+ min) [07747]     Psychotherapy Interactive Complexity [38963]        Primary Care Provider    None Specified       No primary provider on file.        Equal Access to Services     MERCEDEZ LEAL : Milan Vicente, bill bravo, chrissy morenoaljack palacios, lynn de león . So [facility] [phone].    ATENCIÓN: Si habla español, tiene a andrews disposición servicios gratuitos de asistencia lingüística. Llame al [phone].    We comply with applicable federal civil rights laws and [place] laws. We do not discriminate on the basis of race, color, national origin, age, disability, sex, sexual orientation, or gender identity.            Thank you!     Thank you for choosing [place] [facility]  for your care. Our goal is always to provide you with excellent care. Hearing back from our patients is one way we can continue to improve our services. Please take a few minutes to complete the written survey that you may receive in the mail after your visit with us. Thank you!             Your Updated Medication List - Protect others around you: Learn how to safely use, store and throw away your medicines at www.disposemymeds.org.      Notice  As of 7/10/2018  1:57 PM    You have not been prescribed any medications.

## 2018-07-10 NOTE — PROGRESS NOTES
"[facility]  Program in Human Sexuality  Department of Family Medicine & Community Health  [facility]   [address]               [place] [address]  Phone: [phone]  Fax: [phone]  [email or web address]      [facility] -  Case Progress Note    Date of Service: 7/10/18  Client Name: Kristie Behrens  YOB: 1975  MRN:  [id number]  Treating Provider: Angela \"René\" SAURABH Walden, Ph.D  Type of Session: Individual  Present in Session: Patient alone  Number of Minutes:  60  Health Maintenance Summary - Mental Health Treatment Plan       Status Date      Mental Health Tx Plan Q2 MOS Overdue 7/2/2018      Done 5/2/2018         Current Symptoms/Status:  Serious and persistent mental illness, has lived on SSI most of her adulthood. Patient identifies as lesbian but has never had a lesbian sexual relationship. She is interested in finding a relationship. It has been 23 yrs since she's been in a \"love relationship\" and she would like to be in one. Noted she has \"some FGM (, Tibetan)\" issues about sexuality that involved an involuntary vaginal heroin overdose many years ago. Numerous psychiatric hospitalizations: 2011, [facility]; 2007, [facility]; 2004, [facility]. Current Psychiatrist is Dr. Aramis Grover MD who prescribed Zyprexa, Latuda, 40 mg, Ambien. [facility] to present. , DIANE, Thiago Juarez, for 5 yrs. Current therapist - Es Hodge, every other week since 2017. Abstinent from drugs for 14 yrs. Abstinent from alcohol since Feb 14, 2017 after relapse Feb 2016 (abstinent Feb 2012 - 2016)    Progress Toward Treatment Goals:   Continues meeting with  (Mackenzie Taylor) and other case workers every Tuesday, Fridays, and Sundays; added extra day. Activities: 1x library, 1x to [employer], supper (daily), grocery store (1x). No longer interested in attending AA mtgs " "(0x). Meditation, yoga, chavo chi  0x [facility]. Coffee at [employer] 3x a week. ACT goals 1. Coffee with AA member (no), 2. Call for own refills - next week (Nicotine gum & Ambien). Quit smoking Dec 2018. 3. Meet at a coffee shop.. Use distractions from negative voices (write, color, meditation, etc.) - yes, described making paper and will bring in. Psychiatrist prescribed PRN Zyprexa for voices when they get bad. Voices and isolation are vicious cycle. 5. Blood drawn 1x a mo which pt is responsible for. Takes bus to [facility] in [place] - yes; 6. Fight her negative perceptions of others opinions of her; \"everyone hates me, no one loves me, no one cares about me, self-hatred\" - these thoughts prevented her from doing homework to attend AA meeting. Relapse in drinking abstinence.       Intervention: Modality and Description:  Individual cognitive-behavioral, solution-focused, strength-based psychotherapy. Drank 2-3 beers on June 24 after she didn't go to [employer] and on June 25  1/2 bottle of wine; was nervous about going to [employer] but felt bad/sad she didn't go afterwards. Talked to ACT staff (Mackenzie and Haresh) about her drinking. Went to the [employer] 2x (swimming, sauna, exercises), library 1x, [place] [facility]. Voices only a few times in last few weeks (it is boring w/o them).     Did not attend 2 cafes and [facility] AA meeting in [place] identified in last session assigned as homework. We had changed therapy sessions so she can attend AA group regularly but patient no longer wants to go. Discussed the importance of interacting with people in order to have close relationships. Explored family dynamics. Has little contact with her parents and brother who live in [place] --- less than yearly. Explored connecting with [facility], looked at website together, reviewed symptoms of dx of schizoaffective disorder. Suggested patient's FGC beliefs may be a delusion which patient was minimally receptive to. " "Discussed writing for [facility] or getting involved in some of their activities. Patient discussed volunteering, [employer] and other activities.     Response to Intervention:  Patient minimally receptive to intervention. Brief relapse in abstinence from alcohol; commitment to abstinence lower. Now refuses to attend AA meetings, is back to discussing other activities she might get involved with that are not realistic, but was not receptive to other suggestions. Did seem interested in [facility] and given homework. Deterioration in stability and functionality of  past several weeks. Affect is flat and cognitive functioning is concrete, flat and simplistic.    Assignment:  1. Attend [facility] AA group at [facility] in [facility] [place] for LGBT+ at least once before next session. ON HOLD  2. Investigate LGBT+ AA coffee shops: [employer] and [employer].   3. Attend [facility] AA meeting in [place] WEEKLY on Wednesdays, 6:30 - 7:30 pm and take time. REFUSED  4. Review [facility] website and look for ways to become more involved. Gave patient paper copies of webpages.     Interactive Complexity:  Communication difficulties present during current the psychiatric procedure included the need to manage maladaptive communication related to serious mental illness and cognitive flattening that complicated delivery of care.    Diagnosis:  Diagnoses       Codes Comments    Female sexual dysfunction    -  Primary F52.9     Schizoaffective disorder, bipolar type (H)     F25.0         Plan / Need for Future Services:  Return for individual therapy to treat dx problems in 2 weeks.        Angela \"René\" SAURABH Walden, Ph.D.,   Clinical/Medical Director, [place] Licensed Psychologist  [place] Licensed Marriage & Family Therapist & State Approved SupervisorFirst saw her in 2011 and last saw her April 10, 2015. Had extensive mental health team at           "

## 2018-07-11 LAB
CLOZAPINE SERPL-MCNC: 1423 NG/ML
CLOZAPINE+NOR SERPL-MCNC: 2391 NG/ML
CNO SERPL-MCNC: 116 NG/ML
NORCLOZAPINE SERPL-MCNC: 852 NG/ML

## 2018-07-12 ENCOUNTER — TELEPHONE (OUTPATIENT)
Dept: OTHER | Facility: OUTPATIENT CENTER | Age: 43
End: 2018-07-12

## 2018-07-12 NOTE — TELEPHONE ENCOUNTER
"Pt left vm. Looked up [facility] and found a really neat GLBT group on Sat 1-2:30. [place] far on bus and light rail. Found LGBT autism group as well (7-9 on 2nd Thursday). I have only only a little bit autism. I find it difficulty to maintain personal friendships and  relationships. Since we last talked about how she has a delusion about FGC, she is wondering if she has a delusion about autism or not. Tested 5% mute but has perfect hearing. Not sure she understands autism that well. She's see me in a few weeks.       Angela \"René\" SAURABH Walden, Ph.D.,   Clinical/Medical Director, [place] Licensed Psychologist  [place] Licensed Marriage & Family Therapist & State Approved Supervisor    "

## 2018-07-18 ENCOUNTER — AMBULATORY - HEALTHEAST (OUTPATIENT)
Dept: LAB | Facility: CLINIC | Age: 43
End: 2018-07-18

## 2018-07-18 DIAGNOSIS — Z79.899 NEED FOR PROPHYLACTIC CHEMOTHERAPY: ICD-10-CM

## 2018-07-18 LAB
BASOPHILS # BLD AUTO: 0.1 THOU/UL (ref 0–0.2)
BASOPHILS NFR BLD AUTO: 1 % (ref 0–2)
EOSINOPHIL # BLD AUTO: 0.2 THOU/UL (ref 0–0.4)
EOSINOPHIL NFR BLD AUTO: 2 % (ref 0–6)
ERYTHROCYTE [DISTWIDTH] IN BLOOD BY AUTOMATED COUNT: 13 % (ref 11–14.5)
HCT VFR BLD AUTO: 43 % (ref 35–47)
HGB BLD-MCNC: 15.3 G/DL (ref 12–16)
LYMPHOCYTES # BLD AUTO: 1.5 THOU/UL (ref 0.8–4.4)
LYMPHOCYTES NFR BLD AUTO: 14 % (ref 20–40)
MCH RBC QN AUTO: 31.2 PG (ref 27–34)
MCHC RBC AUTO-ENTMCNC: 35.6 G/DL (ref 32–36)
MCV RBC AUTO: 88 FL (ref 80–100)
MONOCYTES # BLD AUTO: 0.6 THOU/UL (ref 0–0.9)
MONOCYTES NFR BLD AUTO: 6 % (ref 2–10)
NEUTROPHILS # BLD AUTO: 8.4 THOU/UL (ref 2–7.7)
NEUTROPHILS NFR BLD AUTO: 78 % (ref 50–70)
PLATELET # BLD AUTO: 181 THOU/UL (ref 140–440)
PMV BLD AUTO: 10.8 FL (ref 8.5–12.5)
RBC # BLD AUTO: 4.91 MILL/UL (ref 3.8–5.4)
WBC: 10.9 THOU/UL (ref 4–11)

## 2018-07-24 ENCOUNTER — OFFICE VISIT (OUTPATIENT)
Dept: OTHER | Facility: OUTPATIENT CENTER | Age: 43
End: 2018-07-24
Payer: MEDICARE

## 2018-07-24 DIAGNOSIS — F52.9 FEMALE SEXUAL DYSFUNCTION: ICD-10-CM

## 2018-07-24 DIAGNOSIS — F25.0 SCHIZOAFFECTIVE DISORDER, BIPOLAR TYPE (H): Primary | ICD-10-CM

## 2018-07-24 NOTE — MR AVS SNAPSHOT
After Visit Summary   7/24/2018    Kristie Behrens    MRN: [id number]           Patient Information     Date Of Birth          1975        Visit Information        Provider Department      7/24/2018 11:00 AM Angela Walden LP [facility]        Today's Diagnoses     Schizoaffective disorder, bipolar type (H)    -  1    Female sexual dysfunction           Follow-ups after your visit        Your next 10 appointments already scheduled     Aug 07, 2018  3:00 PM CDT   Individual Therapy 53+ minutes with Angela Walden LP   [facility] ([facility])    [address]  Mail [address] 7521  [facility] [address]   [phone]            Aug 21, 2018  5:00 PM CDT   Individual Therapy 53+ minutes with Angela Walden LP   [facility] ([facility])    [address]  Mail [address] 7521  [facility] [address]   [phone]            Sep 04, 2018  3:00 PM CDT   Individual Therapy 53+ minutes with Angela Walden LP   [facility] ([facility])    [address]  Mail [address] 7521  [facility] [address]   [phone]            Sep 18, 2018  3:00 PM CDT   Individual Therapy 53+ minutes with Angela Walden LP   [facility] ([facility])    [address]  Mail [address] 7521  [facility] [address]   [phone]              Who to contact     Please call your clinic at [phone] to:    Ask questions about your health    Make or cancel appointments    Discuss your medicines    Learn about your test results    Speak to your doctor            Additional Information About Your Visit        [email or web address] Information     Vision [employer] (V3D) is an electronic gateway that provides easy, online access to your medical records. With [employer] (V3D), you can request a clinic appointment, read your test results, renew a prescription or communicate with your care team.     To sign up  for MyChart visit the website at [email or web address]   You will be asked to enter the access code listed below, as well as some personal information. Please follow the directions to create your username and password.     Your access code is: [email or web address]  Expires: 10/23/2018  4:51 PM     Your access code will  in 90 days. If you need help or a new code, please contact your [facility] or call [phone] for assistance.        Care EveryWhere ID     This is your Care EveryWhere ID. This could be used by other organizations to access your [facility] medical records  [id number]         Blood Pressure from Last 3 Encounters:   No data found for BP    Weight from Last 3 Encounters:   No data found for Wt              We Performed the Following     Individual Psychotherapy (53+ min) [17022]     Psychotherapy Interactive Complexity [98481]        Primary Care Provider    None Specified       No primary provider on file.        Equal Access to Services     Kern ValleyFRANKIE : Hadii philly Vicente, waaxda luemanueladaha, qaybta kaalmada adeaurelio, lynn de león . So [facility] [phone].    ATENCIÓN: Si habla español, tiene a andrews disposición servicios gratuitos de asistencia lingüística. Llame al [phone].    We comply with applicable federal civil rights laws and [place] laws. We do not discriminate on the basis of race, color, national origin, age, disability, sex, sexual orientation, or gender identity.            Thank you!     Thank you for choosing [facility]  for your care. Our goal is always to provide you with excellent care. Hearing back from our patients is one way we can continue to improve our services. Please take a few minutes to complete the written survey that you may receive in the mail after your visit with us. Thank you!             Your Updated Medication List - Protect others around you: Learn how to safely use, store  and throw away your medicines at www.disposemymeds.org.      Notice  As of 7/24/2018 11:59 PM    You have not been prescribed any medications.

## 2018-07-24 NOTE — PROGRESS NOTES
"[facility]  Program in Human Sexuality  Department of Family Medicine & Community Health  [facility]   [address]               [place] [address]  Phone: [phone]  Fax: [phone]  [email or web address]      [facility] -  Case Progress Note    Date of Service: 7/24/18  Client Name: Kristie Behrens  YOB: 1975  MRN:  [id number]  Treating Provider: Angela \"René\" SAURABH Walden, Ph.D  Type of Session: Individual  Present in Session: Patient alone  Number of Minutes:  60  Health Maintenance Summary - Mental Health Treatment Plan       Status Date      Mental Health Tx Plan Q2 MOS Overdue 7/2/2018      Done 5/2/2018         Current Symptoms/Status:  Serious and persistent mental illness, has lived on SSI most of her adulthood. Patient identifies as lesbian and is interested in finding a relationship currently. Noted she has \"some FGM (, Tibetan)\" issues about sexuality that involved an involuntary vaginal heroin overdose many years ago. Numerous psychiatric hospitalizations: 2011, [facility]; 2007, [facility]; 2004, [facility]. Current Psychiatrist is Dr. Aramis Grover MD who prescribed Zyprexa, Latuda, 40 mg, Ambien. [facility] to present. , [facility], [facility], for 5 yrs. Current therapist - Es Hodge, every other week since 2017. Abstinent from drugs for 14 yrs. Abstinent from alcohol since Feb 14, 2017 after relapse Feb 2016 (abstinent Feb 2012 - 2016)    Progress Toward Treatment Goals:   Continues meeting with  (Mackenzie Taylor) and other case workers every Tuesday, Fridays, and Sundays; added extra day. Activities: ?x library, ?x to [employer], [employer] (daily), grocery store (?x). Changed her mind and is again interested in attending AA mtgs (3x). Meditation, yoga, chavo chi  [facility]x [facility]. Coffee at [employer] ?x a week. ACT goals 1. Coffee with AA member " "(no), 2. Call for own refills - next week (Nicotine gum & Ambien). Quit smoking Dec 2018. 3. Meet at a coffee shop.. Use distractions from negative voices (write, color, meditation, etc.) - yes, described making paper and will bring in. Psychiatrist prescribed PRN Zyprexa for voices when they get bad. Voices and isolation are vicious cycle. 5. Blood drawn 1x a mo which pt is responsible for. T[facility] bus to [facility] in [place] - yes; 6. Fight her negative perceptions of others opinions of her; \"everyone hates me, no one loves me, no one cares about me, self-hatred\" Resumed alcohol abstinence, mid July 2018.       Intervention: Modality and Description:  Individual cognitive-behavioral, solution-focused, strength-based psychotherapy. No drinking but started smoking cigarettes 1 PPD.   Attended 2 [facility] AA meeting in [place] and [facility] AA meeting on [employer]; will be looking for a sponsor. Took time to talk about dating and her orientation. Reiterated importance of interacting with people in order to have close relationships. Explored family dynamics. Called her parents and they will be visiting Aug 1. Monthly phone contact and yearly physical contact (they live in [place]). Con't exploring connecting with BRANDON and found a meeting in [facility].  Reviewed symptoms of dx of schizoaffective disorder. Reiterated that patient's FGC beliefs may be a delusion which patient was more receptive to. Discussed writing for BRANDON or getting involved in some of their activities.     Discussed past relationships with women and explored dating website for those with mental illness: http://narrative.ly/matchmaker-for-the-mentally-ill/  19 yo, girlfriend Yesica (senior at [employer]) and patient was a sophomore. [facility] [place] [facility] [employer] together. Held hands and kissed, spent the night together a few times but no genital contact. 18 - 18 yo - Dated Benoit and got genital herpes (1 outbreak)  18 yo, briefly met Jesusita at " "an outdoor dance performance, very payam. 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 About 6 mos ago, patient called Jesusita at the [employer] and left a message but Jesusita never called back.   19 -24 yo - Nubia who was suicidal and made several attempts. Early on, they both had boyfriends.   26 yo, dated a woman who was quite payam for 8-12 months. Heated sex incl genital contact. Aggressive sexually and patient liked that. With femme women, she finds it hard to initiate sex.   27 yo, Tana, AA and lived in [place]. Met on a dating website, [employer] ([employer]). Just together for the weekend b/c she wasn't interested in patient.     Patient has written books in 1998: \"How to Love an Angry Girl\", 72 pages; Lovers a Lesbian Marriage (38 pages), Only Girls Go to Heaven (121 pages) and had it professionally edited for $5000. [place] (about grandparents surviving [facility] but doesn't know if they really were in [facility]). Continues to write: majored in Fine Arts, psychology, Women's Studies, etc).    Response to Intervention:  Patient more receptive to intervention. Recovery from relapse in abstinence from alcohol; commitment to abstinence lower. Return to previous stability and functionality of  past several weeks. Affect is flat and cognitive functioning is concrete, flat and simplistic.    Assignment:  1. Attend [facility] Women's AA group at [facility] in [facility] ([address] [place] for LGBT+ at least once before next session.   2. Investigate LGBT+ AA coffee shops: [employer] and Day-by-Day Cafe, [place] Music Cafe ([place]).  3. Attend [facility] AA meeting in [place] WEEKLY on Wednesdays, 6:30 - 7:30 pm and take time.   4. Review BRANDON website and look for ways to become more involved. Gave patient paper copies of webpages.     Interactive Complexity:  Communication difficulties present during current the psychiatric " "procedure included the need to manage maladaptive communication related to serious mental illness and cognitive flattening that complicated delivery of care.    Diagnosis:  Diagnoses       Codes Comments    Female sexual dysfunction    -  Primary F52.9     Schizoaffective disorder, bipolar type (H)     F25.0         Plan / Need for Future Services:  Return for individual therapy to treat dx problems in 2 weeks.        Angela \"René\" SAURABH Walden, Ph.D.,   Clinical/Medical Director, [place] Licensed Psychologist  [place] Licensed Marriage & Family Therapist & State Approved SupervisorFirst saw her in 2011 and last saw her April 10, 2015. Had extensive mental health team at         "

## 2018-08-03 ENCOUNTER — AMBULATORY - HEALTHEAST (OUTPATIENT)
Dept: LAB | Facility: CLINIC | Age: 43
End: 2018-08-03

## 2018-08-03 DIAGNOSIS — Z79.899 NEED FOR PROPHYLACTIC CHEMOTHERAPY: ICD-10-CM

## 2018-08-03 DIAGNOSIS — F25.0 SCHIZOAFFECTIVE DISORDER, BIPOLAR TYPE (H): ICD-10-CM

## 2018-08-03 LAB
BASOPHILS # BLD AUTO: 0.1 THOU/UL (ref 0–0.2)
BASOPHILS NFR BLD AUTO: 1 % (ref 0–2)
EOSINOPHIL # BLD AUTO: 0.1 THOU/UL (ref 0–0.4)
EOSINOPHIL NFR BLD AUTO: 1 % (ref 0–6)
ERYTHROCYTE [DISTWIDTH] IN BLOOD BY AUTOMATED COUNT: 13.2 % (ref 11–14.5)
HCT VFR BLD AUTO: 43.1 % (ref 35–47)
HGB BLD-MCNC: 15.1 G/DL (ref 12–16)
LYMPHOCYTES # BLD AUTO: 1.2 THOU/UL (ref 0.8–4.4)
LYMPHOCYTES NFR BLD AUTO: 15 % (ref 20–40)
MCH RBC QN AUTO: 31 PG (ref 27–34)
MCHC RBC AUTO-ENTMCNC: 35 G/DL (ref 32–36)
MCV RBC AUTO: 89 FL (ref 80–100)
MONOCYTES # BLD AUTO: 0.5 THOU/UL (ref 0–0.9)
MONOCYTES NFR BLD AUTO: 7 % (ref 2–10)
NEUTROPHILS # BLD AUTO: 6 THOU/UL (ref 2–7.7)
NEUTROPHILS NFR BLD AUTO: 76 % (ref 50–70)
PLATELET # BLD AUTO: 192 THOU/UL (ref 140–440)
PMV BLD AUTO: 10.9 FL (ref 8.5–12.5)
RBC # BLD AUTO: 4.87 MILL/UL (ref 3.8–5.4)
WBC: 7.9 THOU/UL (ref 4–11)

## 2018-08-07 ENCOUNTER — OFFICE VISIT (OUTPATIENT)
Dept: OTHER | Facility: OUTPATIENT CENTER | Age: 43
End: 2018-08-07
Payer: MEDICARE

## 2018-08-07 DIAGNOSIS — F52.9 FEMALE SEXUAL DYSFUNCTION: Primary | ICD-10-CM

## 2018-08-07 DIAGNOSIS — F25.0 SCHIZOAFFECTIVE DISORDER, BIPOLAR TYPE (H): ICD-10-CM

## 2018-08-07 NOTE — MR AVS SNAPSHOT
After Visit Summary   8/7/2018    Kristie Behrens    MRN: [id number]           Patient Information     Date Of Birth          1975        Visit Information        Provider Department      8/7/2018 3:00 PM Angela [facility]den LP [facility]        Today's Diagnoses     Female sexual dysfunction    -  1    Schizoaffective disorder, bipolar type (H)           Follow-ups after your visit        Your next 10 appointments already scheduled     Aug 21, 2018  5:00 PM CDT   Individual Therapy 53+ minutes with Angela Walden LP   [facility] ([facility])    [address]  Mail Code 7521  [facility] [address]   [phone]            Sep 04, 2018  3:00 PM CDT   Individual Therapy 53+ minutes with Angela Walden LP   [facility] ([facility])    [address]  Mail [address] 7521  [facility] [address]   [phone]            Sep 18, 2018  3:00 PM CDT   Individual Therapy 53+ minutes with Angela Walden LP   [facility] ([facility])    [address]  Mail [address] 7521  [facility] [address]   [phone]            Oct 02, 2018  3:00 PM CDT   Individual Therapy 53+ minutes with Angela Walden LP   [facility] ([facility])    [address]  Mail [address] 7521  [facility] [address]   [phone]            Oct 16, 2018  4:00 PM CDT   Individual Therapy 53+ minutes with Angela Walden LP   [facility] ([facility])    [address]  Mail [address] 7521  [facility] [address]   [phone]            Oct 30, 2018  3:00 PM CDT   Individual Therapy 53+ minutes with Angela Walden LP   [facility] ([facility])    [address]  Mail [address] 7521  [facility] [address]   [phone]              Who to contact     Please call your clinic at [phone] to:    Ask  questions about your health    Make or cancel appointments    Discuss your medicines    Learn about your test results    Speak to your doctor            Additional Information About Your Visit        MyChart Information     TÃ¡ximo is an electronic gateway that provides easy, online access to your medical records. With TÃ¡ximo, you can request a clinic appointment, read your test results, renew a prescription or communicate with your care team.     To sign up for TÃ¡ximo visit the website at [email or web address]   You will be asked to enter the access code listed below, as well as some personal information. Please follow the directions to create your username and password.     Your access code is: [email or web address]  Expires: 10/23/2018  4:51 PM     Your access code will  in 90 days. If you need help or a new code, please contact your [facility] or call [phone] for assistance.        Care EveryWhere ID     This is your Care EveryWhere ID. This could be used by other organizations to access your [facility] medical records  [id number]         Blood Pressure from Last 3 Encounters:   No data found for BP    Weight from Last 3 Encounters:   No data found for Wt              We Performed the Following     Individual Psychotherapy (53+ min) [91836]     Mental Health Tx Plan Scan (HIM Scan)     Psychotherapy Interactive Complexity [58360]        Primary Care Provider    None Specified       No primary provider on file.        Equal Access to Services     MERCEDEZ LEAL : Hadii philly tidwell Somartha, waaxda luqadaha, qaybta kaalmada adeegyada, lynn lopez. So [facility] [phone].    ATENCIÓN: Si habla español, tiene a andrews disposición servicios gratuitos de asistencia lingüística. Llame al [phone].    We comply with applicable federal civil rights laws and [place] laws. We do not discriminate on the basis of race, color, national origin, age,  disability, sex, sexual orientation, or gender identity.            Thank you!     Thank you for choosing [facility]  for your care. Our goal is always to provide you with excellent care. Hearing back from our patients is one way we can continue to improve our services. Please take a few minutes to complete the written survey that you may receive in the mail after your visit with us. Thank you!             Your Updated Medication List - Protect others around you: Learn how to safely use, store and throw away your medicines at www.disposemymeds.org.      Notice  As of 8/7/2018 11:59 PM    You have not been prescribed any medications.
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
## 2018-08-10 ENCOUNTER — TELEPHONE (OUTPATIENT)
Dept: OTHER | Facility: OUTPATIENT CENTER | Age: 43
End: 2018-08-10

## 2018-08-28 ENCOUNTER — AMBULATORY - HEALTHEAST (OUTPATIENT)
Dept: LAB | Facility: CLINIC | Age: 43
End: 2018-08-28

## 2018-08-28 DIAGNOSIS — Z79.899 NEED FOR PROPHYLACTIC CHEMOTHERAPY: ICD-10-CM

## 2018-08-28 DIAGNOSIS — F25.0 SCHIZOAFFECTIVE DISORDER, BIPOLAR TYPE (H): ICD-10-CM

## 2018-08-28 LAB
BASOPHILS # BLD AUTO: 0.1 THOU/UL (ref 0–0.2)
BASOPHILS NFR BLD AUTO: 1 % (ref 0–2)
EOSINOPHIL # BLD AUTO: 0.1 THOU/UL (ref 0–0.4)
EOSINOPHIL NFR BLD AUTO: 2 % (ref 0–6)
ERYTHROCYTE [DISTWIDTH] IN BLOOD BY AUTOMATED COUNT: 13.1 % (ref 11–14.5)
HCT VFR BLD AUTO: 41 % (ref 35–47)
HGB BLD-MCNC: 14.4 G/DL (ref 12–16)
LYMPHOCYTES # BLD AUTO: 1.6 THOU/UL (ref 0.8–4.4)
LYMPHOCYTES NFR BLD AUTO: 19 % (ref 20–40)
MCH RBC QN AUTO: 31.3 PG (ref 27–34)
MCHC RBC AUTO-ENTMCNC: 35.1 G/DL (ref 32–36)
MCV RBC AUTO: 89 FL (ref 80–100)
MONOCYTES # BLD AUTO: 0.6 THOU/UL (ref 0–0.9)
MONOCYTES NFR BLD AUTO: 7 % (ref 2–10)
NEUTROPHILS # BLD AUTO: 5.7 THOU/UL (ref 2–7.7)
NEUTROPHILS NFR BLD AUTO: 71 % (ref 50–70)
PLATELET # BLD AUTO: 219 THOU/UL (ref 140–440)
PMV BLD AUTO: 10.2 FL (ref 8.5–12.5)
RBC # BLD AUTO: 4.6 MILL/UL (ref 3.8–5.4)
WBC: 8 THOU/UL (ref 4–11)

## 2018-09-18 ENCOUNTER — OFFICE VISIT (OUTPATIENT)
Dept: OTHER | Facility: OUTPATIENT CENTER | Age: 43
End: 2018-09-18
Payer: MEDICARE

## 2018-09-18 DIAGNOSIS — F52.9 FEMALE SEXUAL DYSFUNCTION: ICD-10-CM

## 2018-09-18 DIAGNOSIS — F25.0 SCHIZOAFFECTIVE DISORDER, BIPOLAR TYPE (H): Primary | ICD-10-CM

## 2018-09-18 NOTE — MR AVS SNAPSHOT
After Visit Summary   9/18/2018    Kristie Behrens    MRN: [id number]           Patient Information     Date Of Birth          1975        Visit Information        Provider Department      9/18/2018 3:00 PM Angela Walden LP [facility]        Today's Diagnoses     Schizoaffective disorder, bipolar type (H)    -  1    Female sexual dysfunction           Follow-ups after your visit        Your next 10 appointments already scheduled     Oct 02, 2018  3:00 PM CDT   Individual Therapy 53+ minutes with Angela Walden LP   [facility] ([facility])    [address]  Mail [address] 75[address]  [facility] [address]   [phone]            Oct 16, 2018  4:00 PM CDT   Individual Therapy 53+ minutes with Angela Walden LP   [facility] ([facility])    [address]  [address]  [facility] [address]   [phone]            Oct 30, 2018  3:00 PM CDT   Individual Therapy 53+ minutes with Angela Walden LP   [facility] ([facility])    [address]  [address]  [facility] [address]   [phone]              Who to contact     Please call your clinic at [phone] to:    Ask questions about your health    Make or cancel appointments    Discuss your medicines    Learn about your test results    Speak to your doctor            Additional Information About Your Visit        MyChart Information     Connexity is an electronic gateway that provides easy, online access to your medical records. With Connexity, you can request a clinic appointment, read your test results, renew a prescription or communicate with your care team.     To sign up for [employer] visit the website at [email or web address]   You will be asked to enter the access code listed below, as well as some personal information. Please follow the directions to create your username and password.     Your  access code is: [email or web address]  Expires: 10/23/2018  4:51 PM     Your access code will  in 90 days. If you need help or a new code, please contact your [facility] or call [phone] for assistance.        Care EveryWhere ID     This is your Care EveryWhere ID. This could be used by other organizations to access your [facility] medical records  [id number]         Blood Pressure from Last 3 Encounters:   No data found for BP    Weight from Last 3 Encounters:   No data found for Wt              We Performed the Following     Individual Psychotherapy (53+ min) [02700]     Psychotherapy Interactive Complexity [56092]        Primary Care Provider    None Specified       No primary provider on file.        Equal Access to Services     MERCEDEZ LEAL : Milan Vicente, bill bravo, chrissy palacios, lynn de león . So [facility] [phone].    ATENCIÓN: Si habla español, tiene a andrews disposición servicios gratuitos de asistencia lingüística. Llame al [phone].    We comply with applicable federal civil rights laws and [place] laws. We do not discriminate on the basis of race, color, national origin, age, disability, sex, sexual orientation, or gender identity.            Thank you!     Thank you for choosing [facility]  for your care. Our goal is always to provide you with excellent care. Hearing back from our patients is one way we can continue to improve our services. Please take a few minutes to complete the written survey that you may receive in the mail after your visit with us. Thank you!             Your Updated Medication List - Protect others around you: Learn how to safely use, store and throw away your medicines at www.disposemymeds.org.      Notice  As of 2018 11:59 PM    You have not been prescribed any medications.
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
## 2018-09-26 ENCOUNTER — AMBULATORY - HEALTHEAST (OUTPATIENT)
Dept: LAB | Facility: CLINIC | Age: 43
End: 2018-09-26

## 2018-09-26 DIAGNOSIS — F25.0 SCHIZOAFFECTIVE DISORDER, BIPOLAR TYPE (H): ICD-10-CM

## 2018-09-26 DIAGNOSIS — Z79.899 NEED FOR PROPHYLACTIC CHEMOTHERAPY: ICD-10-CM

## 2018-09-26 LAB
BASOPHILS # BLD AUTO: 0.1 THOU/UL (ref 0–0.2)
BASOPHILS NFR BLD AUTO: 1 % (ref 0–2)
EOSINOPHIL # BLD AUTO: 0.3 THOU/UL (ref 0–0.4)
EOSINOPHIL NFR BLD AUTO: 3 % (ref 0–6)
ERYTHROCYTE [DISTWIDTH] IN BLOOD BY AUTOMATED COUNT: 13.2 % (ref 11–14.5)
HCT VFR BLD AUTO: 45.7 % (ref 35–47)
HGB BLD-MCNC: 15.4 G/DL (ref 12–16)
LYMPHOCYTES # BLD AUTO: 1.5 THOU/UL (ref 0.8–4.4)
LYMPHOCYTES NFR BLD AUTO: 14 % (ref 20–40)
MCH RBC QN AUTO: 31.1 PG (ref 27–34)
MCHC RBC AUTO-ENTMCNC: 33.7 G/DL (ref 32–36)
MCV RBC AUTO: 92 FL (ref 80–100)
MONOCYTES # BLD AUTO: 0.8 THOU/UL (ref 0–0.9)
MONOCYTES NFR BLD AUTO: 8 % (ref 2–10)
NEUTROPHILS # BLD AUTO: 7.8 THOU/UL (ref 2–7.7)
NEUTROPHILS NFR BLD AUTO: 75 % (ref 50–70)
PLATELET # BLD AUTO: 176 THOU/UL (ref 140–440)
PMV BLD AUTO: 11 FL (ref 8.5–12.5)
RBC # BLD AUTO: 4.95 MILL/UL (ref 3.8–5.4)
WBC: 10.5 THOU/UL (ref 4–11)

## 2018-10-01 ENCOUNTER — TELEPHONE (OUTPATIENT)
Dept: OTHER | Facility: OUTPATIENT CENTER | Age: 43
End: 2018-10-01

## 2018-10-02 ENCOUNTER — TEAM CONFERENCE (OUTPATIENT)
Dept: OTHER | Facility: OUTPATIENT CENTER | Age: 43
End: 2018-10-02

## 2018-10-02 NOTE — TELEPHONE ENCOUNTER
"[facility] Case Closing Summary    Name:  Kristie Behrens  Diagnosis:    Diagnoses        Codes Comments     Female sexual dysfunction    -  Primary F52.9       Schizoaffective disorder, bipolar type (H)     F25.0      Program: REST  Date of Summary: 10/2/2018  Date of Initial [facility] Appointment:  4/18/2018  Date of Last [facility] Appointment:  8/7/2018    Evaluation of Client Status:    Total number of Individual, conjoint, group sessions patient attended:  7 -10    Goals at intake were:   Assess and provide treatment suggestions    At the time of discharge, the client reported the following progress:  Maintain stable functioning, acceptance of lesbian sexual orientation, review of sexual hx.     Therapist Assessment:  % of goals met:  30 %  %of symptoms reversal:  30 %  % treatment effectiveness:   30 %    Prognosis:    Poor    Referred to:  Patient will be seeing a Cheondoism therapist about belief re: her involuntary FGM through an opioid overdose.     Electronically Signed by:      Angela \"René\" SAURABH Walden, Ph.D.,   Clinical/Medical Director, [place] Licensed Psychologist  [place] Licensed Marriage & Family Therapist & State Approved Supervisor      "

## 2018-10-17 ENCOUNTER — TELEPHONE (OUTPATIENT)
Dept: OTHER | Facility: OUTPATIENT CENTER | Age: 43
End: 2018-10-17

## 2018-10-17 NOTE — TELEPHONE ENCOUNTER
"Left vm. Can't make appt this week. Gave no explanation. Will be charged.    Angela \"René\" SAURABH Walden, Ph.D.,   Clinical/Medical Director, [place] Licensed Psychologist  [place] Licensed Marriage & Family Therapist & State Approved Supervisor      "

## 2018-10-17 NOTE — TELEPHONE ENCOUNTER
"Talked to patient who will be changing providers and seeing a Rastafari therapist who understands her FGM through overdose. Asked me to share the medical records with her, which I agreed to do and explained the process of signing CECILIA. Thanked me for her work with her. Noted I'd be closing her case and sending letter.       Angela \"René\" SAURABH Walden, Ph.D.,   Clinical/Medical Director, [place] Licensed Psychologist  [place] Licensed Marriage & Family Therapist & State Approved Supervisor  "

## 2018-10-23 ENCOUNTER — AMBULATORY - HEALTHEAST (OUTPATIENT)
Dept: LAB | Facility: CLINIC | Age: 43
End: 2018-10-23

## 2018-10-23 ENCOUNTER — RECORDS - HEALTHEAST (OUTPATIENT)
Dept: ADMINISTRATIVE | Facility: OTHER | Age: 43
End: 2018-10-23

## 2018-10-23 DIAGNOSIS — F25.0 SCHIZOAFFECTIVE DISORDER, BIPOLAR TYPE (H): ICD-10-CM

## 2018-10-23 DIAGNOSIS — Z79.899 NEED FOR PROPHYLACTIC CHEMOTHERAPY: ICD-10-CM

## 2018-10-23 LAB
BASOPHILS # BLD AUTO: 0.1 THOU/UL (ref 0–0.2)
BASOPHILS NFR BLD AUTO: 1 % (ref 0–2)
EOSINOPHIL # BLD AUTO: 0.3 THOU/UL (ref 0–0.4)
EOSINOPHIL NFR BLD AUTO: 3 % (ref 0–6)
ERYTHROCYTE [DISTWIDTH] IN BLOOD BY AUTOMATED COUNT: 13.2 % (ref 11–14.5)
HCT VFR BLD AUTO: 44.2 % (ref 35–47)
HGB BLD-MCNC: 14.9 G/DL (ref 12–16)
LYMPHOCYTES # BLD AUTO: 1.7 THOU/UL (ref 0.8–4.4)
LYMPHOCYTES NFR BLD AUTO: 19 % (ref 20–40)
MCH RBC QN AUTO: 31 PG (ref 27–34)
MCHC RBC AUTO-ENTMCNC: 33.7 G/DL (ref 32–36)
MCV RBC AUTO: 92 FL (ref 80–100)
MONOCYTES # BLD AUTO: 0.6 THOU/UL (ref 0–0.9)
MONOCYTES NFR BLD AUTO: 7 % (ref 2–10)
NEUTROPHILS # BLD AUTO: 6.4 THOU/UL (ref 2–7.7)
NEUTROPHILS NFR BLD AUTO: 71 % (ref 50–70)
PLATELET # BLD AUTO: 195 THOU/UL (ref 140–440)
PMV BLD AUTO: 10.8 FL (ref 8.5–12.5)
RBC # BLD AUTO: 4.81 MILL/UL (ref 3.8–5.4)
WBC: 9.1 THOU/UL (ref 4–11)

## 2018-10-26 LAB
CLOZAPINE SERPL-MCNC: 648 NG/ML
CLOZAPINE+NOR SERPL-MCNC: 1085 NG/ML
CNO SERPL-MCNC: <100 NG/ML
NORCLOZAPINE SERPL-MCNC: 437 NG/ML

## 2018-11-20 ENCOUNTER — AMBULATORY - HEALTHEAST (OUTPATIENT)
Dept: LAB | Facility: CLINIC | Age: 43
End: 2018-11-20

## 2018-11-20 DIAGNOSIS — Z79.899 NEED FOR PROPHYLACTIC CHEMOTHERAPY: ICD-10-CM

## 2018-11-20 DIAGNOSIS — F25.0 SCHIZOAFFECTIVE DISORDER, BIPOLAR TYPE (H): ICD-10-CM

## 2018-11-20 LAB
BASOPHILS # BLD AUTO: 0.1 THOU/UL (ref 0–0.2)
BASOPHILS NFR BLD AUTO: 1 % (ref 0–2)
EOSINOPHIL # BLD AUTO: 0.3 THOU/UL (ref 0–0.4)
EOSINOPHIL NFR BLD AUTO: 3 % (ref 0–6)
ERYTHROCYTE [DISTWIDTH] IN BLOOD BY AUTOMATED COUNT: 13.1 % (ref 11–14.5)
HCT VFR BLD AUTO: 45.2 % (ref 35–47)
HGB BLD-MCNC: 15.3 G/DL (ref 12–16)
LYMPHOCYTES # BLD AUTO: 1.9 THOU/UL (ref 0.8–4.4)
LYMPHOCYTES NFR BLD AUTO: 19 % (ref 20–40)
MCH RBC QN AUTO: 30.7 PG (ref 27–34)
MCHC RBC AUTO-ENTMCNC: 33.8 G/DL (ref 32–36)
MCV RBC AUTO: 91 FL (ref 80–100)
MONOCYTES # BLD AUTO: 0.5 THOU/UL (ref 0–0.9)
MONOCYTES NFR BLD AUTO: 5 % (ref 2–10)
NEUTROPHILS # BLD AUTO: 7 THOU/UL (ref 2–7.7)
NEUTROPHILS NFR BLD AUTO: 72 % (ref 50–70)
PLATELET # BLD AUTO: 178 THOU/UL (ref 140–440)
PMV BLD AUTO: 10.8 FL (ref 8.5–12.5)
RBC # BLD AUTO: 4.98 MILL/UL (ref 3.8–5.4)
WBC: 9.8 THOU/UL (ref 4–11)

## 2018-12-21 ENCOUNTER — AMBULATORY - HEALTHEAST (OUTPATIENT)
Dept: LAB | Facility: CLINIC | Age: 43
End: 2018-12-21

## 2018-12-21 DIAGNOSIS — Z79.899 NEED FOR PROPHYLACTIC CHEMOTHERAPY: ICD-10-CM

## 2018-12-21 DIAGNOSIS — F25.0 SCHIZOAFFECTIVE DISORDER, BIPOLAR TYPE (H): ICD-10-CM

## 2018-12-21 LAB
BASOPHILS # BLD AUTO: 0.1 THOU/UL (ref 0–0.2)
BASOPHILS NFR BLD AUTO: 1 % (ref 0–2)
EOSINOPHIL # BLD AUTO: 0.2 THOU/UL (ref 0–0.4)
EOSINOPHIL NFR BLD AUTO: 2 % (ref 0–6)
ERYTHROCYTE [DISTWIDTH] IN BLOOD BY AUTOMATED COUNT: 12.7 % (ref 11–14.5)
HCT VFR BLD AUTO: 41.8 % (ref 35–47)
HGB BLD-MCNC: 14.1 G/DL (ref 12–16)
LYMPHOCYTES # BLD AUTO: 1.6 THOU/UL (ref 0.8–4.4)
LYMPHOCYTES NFR BLD AUTO: 15 % (ref 20–40)
MCH RBC QN AUTO: 30.9 PG (ref 27–34)
MCHC RBC AUTO-ENTMCNC: 33.7 G/DL (ref 32–36)
MCV RBC AUTO: 92 FL (ref 80–100)
MONOCYTES # BLD AUTO: 0.7 THOU/UL (ref 0–0.9)
MONOCYTES NFR BLD AUTO: 7 % (ref 2–10)
NEUTROPHILS # BLD AUTO: 8 THOU/UL (ref 2–7.7)
NEUTROPHILS NFR BLD AUTO: 76 % (ref 50–70)
PLATELET # BLD AUTO: 184 THOU/UL (ref 140–440)
PMV BLD AUTO: 10.9 FL (ref 8.5–12.5)
RBC # BLD AUTO: 4.56 MILL/UL (ref 3.8–5.4)
WBC: 10.6 THOU/UL (ref 4–11)

## 2019-01-17 ENCOUNTER — AMBULATORY - HEALTHEAST (OUTPATIENT)
Dept: LAB | Facility: CLINIC | Age: 44
End: 2019-01-17

## 2019-01-17 DIAGNOSIS — F25.0 SCHIZOAFFECTIVE DISORDER, BIPOLAR TYPE (H): ICD-10-CM

## 2019-01-17 DIAGNOSIS — Z79.899 NEED FOR PROPHYLACTIC CHEMOTHERAPY: ICD-10-CM

## 2019-01-17 LAB
BASOPHILS # BLD AUTO: 0.1 THOU/UL (ref 0–0.2)
BASOPHILS NFR BLD AUTO: 1 % (ref 0–2)
EOSINOPHIL # BLD AUTO: 0.2 THOU/UL (ref 0–0.4)
EOSINOPHIL NFR BLD AUTO: 2 % (ref 0–6)
ERYTHROCYTE [DISTWIDTH] IN BLOOD BY AUTOMATED COUNT: 13.1 % (ref 11–14.5)
HCT VFR BLD AUTO: 44.1 % (ref 35–47)
HGB BLD-MCNC: 14.6 G/DL (ref 12–16)
LYMPHOCYTES # BLD AUTO: 1.5 THOU/UL (ref 0.8–4.4)
LYMPHOCYTES NFR BLD AUTO: 20 % (ref 20–40)
MCH RBC QN AUTO: 30.2 PG (ref 27–34)
MCHC RBC AUTO-ENTMCNC: 33.1 G/DL (ref 32–36)
MCV RBC AUTO: 91 FL (ref 80–100)
MONOCYTES # BLD AUTO: 0.5 THOU/UL (ref 0–0.9)
MONOCYTES NFR BLD AUTO: 7 % (ref 2–10)
NEUTROPHILS # BLD AUTO: 5.2 THOU/UL (ref 2–7.7)
NEUTROPHILS NFR BLD AUTO: 69 % (ref 50–70)
PLATELET # BLD AUTO: 211 THOU/UL (ref 140–440)
PMV BLD AUTO: 10.7 FL (ref 8.5–12.5)
RBC # BLD AUTO: 4.84 MILL/UL (ref 3.8–5.4)
WBC: 7.5 THOU/UL (ref 4–11)

## 2021-05-30 ENCOUNTER — RECORDS - HEALTHEAST (OUTPATIENT)
Dept: ADMINISTRATIVE | Facility: CLINIC | Age: 46
End: 2021-05-30